# Patient Record
Sex: FEMALE | Race: WHITE | Employment: OTHER | URBAN - METROPOLITAN AREA
[De-identification: names, ages, dates, MRNs, and addresses within clinical notes are randomized per-mention and may not be internally consistent; named-entity substitution may affect disease eponyms.]

---

## 2023-02-16 PROBLEM — Z04.9 CONDITION NOT FOUND: Status: ACTIVE | Noted: 2023-02-16

## 2023-02-16 PROBLEM — E78.5 HYPERLIPIDEMIA: Status: ACTIVE | Noted: 2021-09-10

## 2023-02-16 PROBLEM — M93.90 OSTEOCHONDROPATHY: Status: ACTIVE | Noted: 2021-09-10

## 2023-02-16 PROBLEM — E66.01 MORBID OBESITY (MULTI): Status: ACTIVE | Noted: 2021-09-10

## 2023-02-16 PROBLEM — E78.2 MIXED HYPERLIPIDEMIA: Status: ACTIVE | Noted: 2021-09-10

## 2023-02-16 PROBLEM — I10 ESSENTIAL HYPERTENSION: Status: ACTIVE | Noted: 2022-03-29

## 2023-02-16 PROBLEM — M19.91 LOCALIZED, PRIMARY OSTEOARTHRITIS: Status: ACTIVE | Noted: 2022-03-29

## 2023-02-16 PROBLEM — E55.9 VITAMIN D DEFICIENCY: Status: ACTIVE | Noted: 2021-09-10

## 2023-02-16 PROBLEM — R03.0 ELEVATED BLOOD-PRESSURE READING WITHOUT DIAGNOSIS OF HYPERTENSION: Status: ACTIVE | Noted: 2021-09-10

## 2023-02-16 PROBLEM — Z72.0 TOBACCO USER: Status: ACTIVE | Noted: 2023-02-16

## 2023-02-16 RX ORDER — AMLODIPINE BESYLATE 5 MG/1
1 TABLET ORAL DAILY
COMMUNITY
Start: 2021-12-13 | End: 2023-03-20 | Stop reason: SDUPTHER

## 2023-02-16 RX ORDER — CHLORTHALIDONE 50 MG/1
1 TABLET ORAL DAILY
COMMUNITY
Start: 2021-11-24 | End: 2023-03-20 | Stop reason: SDUPTHER

## 2023-02-16 RX ORDER — POTASSIUM CHLORIDE 1500 MG/1
2 TABLET, EXTENDED RELEASE ORAL DAILY
COMMUNITY
Start: 2021-11-29 | End: 2023-03-20 | Stop reason: SDUPTHER

## 2023-03-14 LAB
ALANINE AMINOTRANSFERASE (SGPT) (U/L) IN SER/PLAS: 12 U/L (ref 7–45)
ALBUMIN (G/DL) IN SER/PLAS: 4.2 G/DL (ref 3.4–5)
ALKALINE PHOSPHATASE (U/L) IN SER/PLAS: 78 U/L (ref 33–136)
ANION GAP IN SER/PLAS: 9 MMOL/L (ref 10–20)
ASPARTATE AMINOTRANSFERASE (SGOT) (U/L) IN SER/PLAS: 17 U/L (ref 9–39)
BILIRUBIN TOTAL (MG/DL) IN SER/PLAS: 0.9 MG/DL (ref 0–1.2)
CALCIDIOL (25 OH VITAMIN D3) (NG/ML) IN SER/PLAS: 22 NG/ML
CALCIUM (MG/DL) IN SER/PLAS: 9.8 MG/DL (ref 8.6–10.3)
CARBON DIOXIDE, TOTAL (MMOL/L) IN SER/PLAS: 35 MMOL/L (ref 21–32)
CHLORIDE (MMOL/L) IN SER/PLAS: 100 MMOL/L (ref 98–107)
CHOLESTEROL (MG/DL) IN SER/PLAS: 229 MG/DL (ref 0–199)
CHOLESTEROL IN HDL (MG/DL) IN SER/PLAS: 63 MG/DL
CHOLESTEROL/HDL RATIO: 3.6
CREATININE (MG/DL) IN SER/PLAS: 0.55 MG/DL (ref 0.5–1.05)
GFR FEMALE: >90 ML/MIN/1.73M2
GLUCOSE (MG/DL) IN SER/PLAS: 82 MG/DL (ref 74–99)
LDL: 135 MG/DL (ref 0–99)
POTASSIUM (MMOL/L) IN SER/PLAS: 3.9 MMOL/L (ref 3.5–5.3)
PROTEIN TOTAL: 7.2 G/DL (ref 6.4–8.2)
SODIUM (MMOL/L) IN SER/PLAS: 140 MMOL/L (ref 136–145)
TRIGLYCERIDE (MG/DL) IN SER/PLAS: 155 MG/DL (ref 0–149)
UREA NITROGEN (MG/DL) IN SER/PLAS: 17 MG/DL (ref 6–23)
VLDL: 31 MG/DL (ref 0–40)

## 2023-03-20 ENCOUNTER — OFFICE VISIT (OUTPATIENT)
Dept: PRIMARY CARE | Facility: CLINIC | Age: 73
End: 2023-03-20
Payer: MEDICARE

## 2023-03-20 VITALS
DIASTOLIC BLOOD PRESSURE: 80 MMHG | TEMPERATURE: 97.5 F | HEIGHT: 66 IN | OXYGEN SATURATION: 98 % | WEIGHT: 223 LBS | SYSTOLIC BLOOD PRESSURE: 128 MMHG | HEART RATE: 67 BPM | BODY MASS INDEX: 35.84 KG/M2

## 2023-03-20 DIAGNOSIS — G25.0 BENIGN ESSENTIAL TREMOR: Chronic | ICD-10-CM

## 2023-03-20 DIAGNOSIS — R93.1 ELEVATED CORONARY ARTERY CALCIUM SCORE: ICD-10-CM

## 2023-03-20 DIAGNOSIS — I10 ESSENTIAL HYPERTENSION: Chronic | ICD-10-CM

## 2023-03-20 DIAGNOSIS — E55.9 VITAMIN D DEFICIENCY: ICD-10-CM

## 2023-03-20 DIAGNOSIS — E66.01 MORBID OBESITY (MULTI): Chronic | ICD-10-CM

## 2023-03-20 DIAGNOSIS — M85.89 OSTEOPENIA OF MULTIPLE SITES: Chronic | ICD-10-CM

## 2023-03-20 DIAGNOSIS — E78.2 MIXED HYPERLIPIDEMIA: ICD-10-CM

## 2023-03-20 DIAGNOSIS — Z13.6 SCREENING FOR HEART DISEASE: ICD-10-CM

## 2023-03-20 DIAGNOSIS — F17.210 NICOTINE DEPENDENCE, CIGARETTES, UNCOMPLICATED: Chronic | ICD-10-CM

## 2023-03-20 DIAGNOSIS — E87.6 DRUG-INDUCED HYPOKALEMIA: ICD-10-CM

## 2023-03-20 DIAGNOSIS — Z00.00 ROUTINE GENERAL MEDICAL EXAMINATION AT HEALTH CARE FACILITY: Primary | ICD-10-CM

## 2023-03-20 DIAGNOSIS — M17.0 BILATERAL PRIMARY OSTEOARTHRITIS OF KNEE: ICD-10-CM

## 2023-03-20 DIAGNOSIS — T50.905A DRUG-INDUCED HYPOKALEMIA: ICD-10-CM

## 2023-03-20 PROBLEM — E78.1 HYPERTRIGLYCERIDEMIA: Chronic | Status: ACTIVE | Noted: 2021-09-10

## 2023-03-20 PROBLEM — E78.1 HYPERTRIGLYCERIDEMIA: Status: ACTIVE | Noted: 2021-09-10

## 2023-03-20 PROBLEM — M85.80 OSTEOPENIA: Chronic | Status: ACTIVE | Noted: 2023-03-20

## 2023-03-20 PROBLEM — M85.80 OSTEOPENIA: Status: ACTIVE | Noted: 2023-03-20

## 2023-03-20 PROBLEM — M93.90 OSTEOCHONDROPATHY: Status: RESOLVED | Noted: 2021-09-10 | Resolved: 2023-03-20

## 2023-03-20 PROCEDURE — 3079F DIAST BP 80-89 MM HG: CPT | Performed by: FAMILY MEDICINE

## 2023-03-20 PROCEDURE — 1170F FXNL STATUS ASSESSED: CPT | Performed by: FAMILY MEDICINE

## 2023-03-20 PROCEDURE — 4004F PT TOBACCO SCREEN RCVD TLK: CPT | Performed by: FAMILY MEDICINE

## 2023-03-20 PROCEDURE — 3074F SYST BP LT 130 MM HG: CPT | Performed by: FAMILY MEDICINE

## 2023-03-20 PROCEDURE — 1160F RVW MEDS BY RX/DR IN RCRD: CPT | Performed by: FAMILY MEDICINE

## 2023-03-20 PROCEDURE — 20610 DRAIN/INJ JOINT/BURSA W/O US: CPT | Performed by: FAMILY MEDICINE

## 2023-03-20 PROCEDURE — 99406 BEHAV CHNG SMOKING 3-10 MIN: CPT | Performed by: FAMILY MEDICINE

## 2023-03-20 PROCEDURE — G0439 PPPS, SUBSEQ VISIT: HCPCS | Performed by: FAMILY MEDICINE

## 2023-03-20 PROCEDURE — 99214 OFFICE O/P EST MOD 30 MIN: CPT | Performed by: FAMILY MEDICINE

## 2023-03-20 PROCEDURE — 1159F MED LIST DOCD IN RCRD: CPT | Performed by: FAMILY MEDICINE

## 2023-03-20 RX ORDER — LIDOCAINE HYDROCHLORIDE 20 MG/ML
2 INJECTION, SOLUTION INFILTRATION; PERINEURAL ONCE
Status: COMPLETED | OUTPATIENT
Start: 2023-03-20 | End: 2023-03-20

## 2023-03-20 RX ORDER — TRIAMCINOLONE ACETONIDE 40 MG/ML
40 INJECTION, SUSPENSION INTRA-ARTICULAR; INTRAMUSCULAR ONCE
Status: COMPLETED | OUTPATIENT
Start: 2023-03-20 | End: 2023-03-20

## 2023-03-20 RX ORDER — CHLORTHALIDONE 50 MG/1
50 TABLET ORAL DAILY
Qty: 90 TABLET | Refills: 1 | Status: SHIPPED | OUTPATIENT
Start: 2023-03-20 | End: 2023-09-12 | Stop reason: SDUPTHER

## 2023-03-20 RX ORDER — AMLODIPINE BESYLATE 5 MG/1
5 TABLET ORAL DAILY
Qty: 90 TABLET | Refills: 1 | Status: SHIPPED | OUTPATIENT
Start: 2023-03-20 | End: 2023-09-12 | Stop reason: SDUPTHER

## 2023-03-20 RX ORDER — POTASSIUM CHLORIDE 1500 MG/1
40 TABLET, EXTENDED RELEASE ORAL DAILY
Qty: 180 TABLET | Refills: 1 | Status: SHIPPED | OUTPATIENT
Start: 2023-03-20 | End: 2023-09-12 | Stop reason: SDUPTHER

## 2023-03-20 RX ORDER — FUROSEMIDE 20 MG/1
1 TABLET ORAL DAILY PRN
COMMUNITY
Start: 2020-06-16 | End: 2023-03-20 | Stop reason: ALTCHOICE

## 2023-03-20 RX ORDER — BUPROPION HYDROCHLORIDE 100 MG/1
100 TABLET, EXTENDED RELEASE ORAL 2 TIMES DAILY
Qty: 60 TABLET | Refills: 0 | Status: SHIPPED | OUTPATIENT
Start: 2023-03-20 | End: 2023-09-12 | Stop reason: ALTCHOICE

## 2023-03-20 RX ADMIN — TRIAMCINOLONE ACETONIDE 40 MG: 40 INJECTION, SUSPENSION INTRA-ARTICULAR; INTRAMUSCULAR at 11:17

## 2023-03-20 RX ADMIN — LIDOCAINE HYDROCHLORIDE 40 MG: 20 INJECTION, SOLUTION INFILTRATION; PERINEURAL at 11:13

## 2023-03-20 RX ADMIN — LIDOCAINE HYDROCHLORIDE 40 MG: 20 INJECTION, SOLUTION INFILTRATION; PERINEURAL at 11:15

## 2023-03-20 ASSESSMENT — ACTIVITIES OF DAILY LIVING (ADL)
DOING_HOUSEWORK: INDEPENDENT
MANAGING_FINANCES: INDEPENDENT
TAKING_MEDICATION: INDEPENDENT
DRESSING: INDEPENDENT
GROCERY_SHOPPING: INDEPENDENT
BATHING: INDEPENDENT

## 2023-03-20 ASSESSMENT — ENCOUNTER SYMPTOMS
FEVER: 0
SHORTNESS OF BREATH: 0
ABDOMINAL PAIN: 0
DIARRHEA: 0
COUGH: 0
DYSURIA: 0
NAUSEA: 0
VOMITING: 0
CHILLS: 0

## 2023-03-20 ASSESSMENT — PATIENT HEALTH QUESTIONNAIRE - PHQ9
2. FEELING DOWN, DEPRESSED OR HOPELESS: NOT AT ALL
1. LITTLE INTEREST OR PLEASURE IN DOING THINGS: NOT AT ALL
SUM OF ALL RESPONSES TO PHQ9 QUESTIONS 1 AND 2: 0

## 2023-03-20 NOTE — ASSESSMENT & PLAN NOTE
Due to ASCVD risk of 23.6%, recommend statin.  Patient prefers not to start medication at this time.  Recommend that we check a calcium score to further evaluate her risk.

## 2023-03-20 NOTE — ASSESSMENT & PLAN NOTE
Spent more than 3 minutes but less than 10 minutes counseling on smoking cessation.  Start bupropion twice daily to help with smoking cessation.

## 2023-03-20 NOTE — PROGRESS NOTES
"Subjective   Reason for Visit: Kathya Hassan is an 72 y.o. female here for a Medicare Wellness visit.     Past Medical, Surgical, and Family History reviewed and updated in chart.    Reviewed all medications by prescribing practitioner or clinical pharmacist (such as prescriptions, OTCs, herbal therapies and supplements) and documented in the medical record.    Overall Kathya has been feeling well.  Her main issue is knee pain.  Did see Dr. Salcedo last year and was told that needed a right knee replacement.  Due to logistics of having surgery, patient wishes to prefer knee surgery until the fall 2023.  She is requesting a knee injection today so that she can continue walking pain-free throughout the summer.    Kathya is taking her blood pressure medicine as prescribed has not missed any doses and is not having any adverse effects.    She is ready to quit smoking. Has not tried medication in the past but is willing to try something that can help with her cravings.        Patient Care Team:  Lisa Lewis DO as PCP - General     Review of Systems   Constitutional:  Negative for chills and fever.   Respiratory:  Negative for cough and shortness of breath.    Cardiovascular:  Negative for chest pain.   Gastrointestinal:  Negative for abdominal pain, diarrhea, nausea and vomiting.   Genitourinary:  Negative for dysuria.       Objective   Vitals:  /80   Pulse 67   Temp 36.4 °C (97.5 °F)   Ht 1.676 m (5' 6\")   Wt 101 kg (223 lb)   SpO2 98%   BMI 35.99 kg/m²       Physical Exam  Constitutional:       General: She is not in acute distress.     Appearance: Normal appearance.   HENT:      Head: Normocephalic.      Mouth/Throat:      Mouth: Mucous membranes are moist.   Eyes:      Extraocular Movements: Extraocular movements intact.      Conjunctiva/sclera: Conjunctivae normal.   Cardiovascular:      Rate and Rhythm: Normal rate and regular rhythm.      Heart sounds: No murmur heard.  Pulmonary:      Breath " sounds: No wheezing or rhonchi.   Musculoskeletal:      Cervical back: Neck supple.   Skin:     General: Skin is warm and dry.   Neurological:      Mental Status: She is alert.   Psychiatric:         Mood and Affect: Mood normal.         Behavior: Behavior normal.       Knee Injection Procedure Note    Region: bilateral knees    Indications: DJD of knee    Consent: Risks, benefits, and alternatives discussed with patient. Patient informed and understands. Verbal consent obtained.    Preparation: Patient positioned appropriately. Skin prepped with alcohol. Clean technique maintained throughout.     Procedure:  Right knee entered inferolaterally and injected with 1 cc of Kenalog 40 mg/cc and  2cc Lidocaine plain 2%. Left knee entered inferomedially and injected with 1 cc of Kenalog 40 mg/cc and  2cc Lidocaine plain 2%.      Complications: None; patient tolerated the procedure well.    Post-procedure counseling: OK to resume most normal activities, OK to resume NSAIDs. Notify office if redness, swelling, increasing pain or fever.      Assessment/Plan   Problem List Items Addressed This Visit          Nervous    RESOLVED: Benign essential tremor (Chronic)    Relevant Orders    Lipid Panel    Follow Up In Primary Care       Circulatory    Essential hypertension (Chronic)    Current Assessment & Plan     Stable.  Continue amlodipine and chlorthalidone.         Relevant Medications    amLODIPine (Norvasc) 5 mg tablet    chlorthalidone (Hygroton) 50 mg tablet    Other Relevant Orders    CT cardiac scoring wo IV contrast    Follow Up In Primary Care       Musculoskeletal    Osteopenia (Chronic)    Relevant Orders    Follow Up In Primary Care    Bilateral primary osteoarthritis of knee (Chronic)    Overview     Did see Dr. Salcedo in 2022.  Right knee replacement recommended         Current Assessment & Plan     Bilateral knee injections given today.         Relevant Medications    lidocaine (Xylocaine) 20 mg/mL (2 %) injection  40 mg (Completed)    triamcinolone acetonide (Kenalog-40) injection 40 mg (Completed)    lidocaine (Xylocaine) 20 mg/mL (2 %) injection 40 mg (Completed)    triamcinolone acetonide (Kenalog-40) injection 40 mg (Completed)       Endocrine/Metabolic    Morbid obesity (CMS/HCC) (Chronic)    Vitamin D deficiency       Other    Mixed hyperlipidemia (Chronic)    Current Assessment & Plan     Due to ASCVD risk of 23.6%, recommend statin.  Patient prefers not to start medication at this time.  Recommend that we check a calcium score to further evaluate her risk.         Relevant Orders    Lipid Panel    Nicotine dependence, cigarettes, uncomplicated (Chronic)    Current Assessment & Plan     Spent more than 3 minutes but less than 10 minutes counseling on smoking cessation.  Start bupropion twice daily to help with smoking cessation.         Relevant Medications    buPROPion SR (Wellbutrin SR) 100 mg 12 hr tablet    Drug-induced hypokalemia (Chronic)    Current Assessment & Plan     Continue potassium supplement while on chlorthalidone.         Relevant Medications    potassium chloride CR (K-Tab) 20 mEq ER tablet    Other Relevant Orders    Comprehensive Metabolic Panel     Other Visit Diagnoses       Routine general medical examination at health care facility    -  Primary    Screening for heart disease        Relevant Orders    CT cardiac scoring wo IV contrast

## 2023-04-03 ENCOUNTER — TELEPHONE (OUTPATIENT)
Dept: PRIMARY CARE | Facility: CLINIC | Age: 73
End: 2023-04-03
Payer: MEDICARE

## 2023-04-03 NOTE — TELEPHONE ENCOUNTER
----- Message from Lisa Lewis DO sent at 4/2/2023 12:53 PM EDT -----  Please let patient know that her calcium score was severely elevated.  A normal calcium score is 0 and hers was 2578.  Means that she has a very high risk of heart disease.  There are also changes noted on her mitral valve.  Due to these findings, I recommend that she see a cardiologist and consider starting cholesterol medication.  I have placed an order for cardiology consult.   Detail Level: Detailed

## 2023-04-03 NOTE — TELEPHONE ENCOUNTER
----- Message from Lisa Lewis DO sent at 4/2/2023 12:53 PM EDT -----  Please let patient know that her calcium score was severely elevated.  A normal calcium score is 0 and hers was 2578.  Means that she has a very high risk of heart disease.  There are also changes noted on her mitral valve.  Due to these findings, I recommend that she see a cardiologist and consider starting cholesterol medication.  I have placed an order for cardiology consult.

## 2023-06-12 LAB
CHOLESTEROL (MG/DL) IN SER/PLAS: 132 MG/DL (ref 0–199)
CHOLESTEROL IN HDL (MG/DL) IN SER/PLAS: 78.6 MG/DL
CHOLESTEROL/HDL RATIO: 1.7
LDL: 37 MG/DL (ref 0–99)
TRIGLYCERIDE (MG/DL) IN SER/PLAS: 82 MG/DL (ref 0–149)
VLDL: 16 MG/DL (ref 0–40)

## 2023-09-01 ENCOUNTER — LAB (OUTPATIENT)
Dept: LAB | Facility: LAB | Age: 73
End: 2023-09-01
Payer: MEDICARE

## 2023-09-01 DIAGNOSIS — T50.905A DRUG-INDUCED HYPOKALEMIA: ICD-10-CM

## 2023-09-01 DIAGNOSIS — E78.2 MIXED HYPERLIPIDEMIA: ICD-10-CM

## 2023-09-01 DIAGNOSIS — E87.6 DRUG-INDUCED HYPOKALEMIA: ICD-10-CM

## 2023-09-01 DIAGNOSIS — G25.0 BENIGN ESSENTIAL TREMOR: Chronic | ICD-10-CM

## 2023-09-01 LAB
ALANINE AMINOTRANSFERASE (SGPT) (U/L) IN SER/PLAS: 18 U/L (ref 7–45)
ALBUMIN (G/DL) IN SER/PLAS: 4.4 G/DL (ref 3.4–5)
ALKALINE PHOSPHATASE (U/L) IN SER/PLAS: 77 U/L (ref 33–136)
ANION GAP IN SER/PLAS: 12 MMOL/L (ref 10–20)
ASPARTATE AMINOTRANSFERASE (SGOT) (U/L) IN SER/PLAS: 22 U/L (ref 9–39)
BILIRUBIN TOTAL (MG/DL) IN SER/PLAS: 1.2 MG/DL (ref 0–1.2)
CALCIUM (MG/DL) IN SER/PLAS: 10.1 MG/DL (ref 8.6–10.3)
CARBON DIOXIDE, TOTAL (MMOL/L) IN SER/PLAS: 33 MMOL/L (ref 21–32)
CHLORIDE (MMOL/L) IN SER/PLAS: 98 MMOL/L (ref 98–107)
CHOLESTEROL (MG/DL) IN SER/PLAS: 150 MG/DL (ref 0–199)
CHOLESTEROL IN HDL (MG/DL) IN SER/PLAS: 73.2 MG/DL
CHOLESTEROL/HDL RATIO: 2
CREATININE (MG/DL) IN SER/PLAS: 0.6 MG/DL (ref 0.5–1.05)
GFR FEMALE: >90 ML/MIN/1.73M2
GLUCOSE (MG/DL) IN SER/PLAS: 87 MG/DL (ref 74–99)
LDL: 49 MG/DL (ref 0–99)
POTASSIUM (MMOL/L) IN SER/PLAS: 3.8 MMOL/L (ref 3.5–5.3)
PROTEIN TOTAL: 6.9 G/DL (ref 6.4–8.2)
SODIUM (MMOL/L) IN SER/PLAS: 139 MMOL/L (ref 136–145)
TRIGLYCERIDE (MG/DL) IN SER/PLAS: 140 MG/DL (ref 0–149)
UREA NITROGEN (MG/DL) IN SER/PLAS: 15 MG/DL (ref 6–23)
VLDL: 28 MG/DL (ref 0–40)

## 2023-09-01 PROCEDURE — 80053 COMPREHEN METABOLIC PANEL: CPT

## 2023-09-01 PROCEDURE — 80061 LIPID PANEL: CPT

## 2023-09-01 PROCEDURE — 36415 COLL VENOUS BLD VENIPUNCTURE: CPT

## 2023-09-12 ENCOUNTER — OFFICE VISIT (OUTPATIENT)
Dept: PRIMARY CARE | Facility: CLINIC | Age: 73
End: 2023-09-12
Payer: MEDICARE

## 2023-09-12 VITALS
HEART RATE: 68 BPM | SYSTOLIC BLOOD PRESSURE: 126 MMHG | TEMPERATURE: 97 F | OXYGEN SATURATION: 96 % | BODY MASS INDEX: 36.8 KG/M2 | DIASTOLIC BLOOD PRESSURE: 74 MMHG | WEIGHT: 228 LBS

## 2023-09-12 DIAGNOSIS — Z12.31 ENCOUNTER FOR SCREENING MAMMOGRAM FOR MALIGNANT NEOPLASM OF BREAST: ICD-10-CM

## 2023-09-12 DIAGNOSIS — E55.9 VITAMIN D DEFICIENCY: ICD-10-CM

## 2023-09-12 DIAGNOSIS — E66.01 MORBID OBESITY (MULTI): Chronic | ICD-10-CM

## 2023-09-12 DIAGNOSIS — F17.210 NICOTINE DEPENDENCE, CIGARETTES, UNCOMPLICATED: Chronic | ICD-10-CM

## 2023-09-12 DIAGNOSIS — E87.6 DRUG-INDUCED HYPOKALEMIA: ICD-10-CM

## 2023-09-12 DIAGNOSIS — E78.2 MIXED HYPERLIPIDEMIA: Primary | Chronic | ICD-10-CM

## 2023-09-12 DIAGNOSIS — T50.905A DRUG-INDUCED HYPOKALEMIA: ICD-10-CM

## 2023-09-12 DIAGNOSIS — G25.0 BENIGN ESSENTIAL TREMOR: Chronic | ICD-10-CM

## 2023-09-12 DIAGNOSIS — Z23 NEEDS FLU SHOT: ICD-10-CM

## 2023-09-12 DIAGNOSIS — M85.89 OSTEOPENIA OF MULTIPLE SITES: Chronic | ICD-10-CM

## 2023-09-12 DIAGNOSIS — M17.0 BILATERAL PRIMARY OSTEOARTHRITIS OF KNEE: Chronic | ICD-10-CM

## 2023-09-12 DIAGNOSIS — I10 ESSENTIAL HYPERTENSION: Chronic | ICD-10-CM

## 2023-09-12 PROBLEM — R93.1 ELEVATED CORONARY ARTERY CALCIUM SCORE: Chronic | Status: ACTIVE | Noted: 2023-09-12

## 2023-09-12 PROCEDURE — 1159F MED LIST DOCD IN RCRD: CPT | Performed by: FAMILY MEDICINE

## 2023-09-12 PROCEDURE — 3074F SYST BP LT 130 MM HG: CPT | Performed by: FAMILY MEDICINE

## 2023-09-12 PROCEDURE — 3078F DIAST BP <80 MM HG: CPT | Performed by: FAMILY MEDICINE

## 2023-09-12 PROCEDURE — G0008 ADMIN INFLUENZA VIRUS VAC: HCPCS | Performed by: FAMILY MEDICINE

## 2023-09-12 PROCEDURE — 99214 OFFICE O/P EST MOD 30 MIN: CPT | Performed by: FAMILY MEDICINE

## 2023-09-12 PROCEDURE — 1160F RVW MEDS BY RX/DR IN RCRD: CPT | Performed by: FAMILY MEDICINE

## 2023-09-12 PROCEDURE — 90662 IIV NO PRSV INCREASED AG IM: CPT | Performed by: FAMILY MEDICINE

## 2023-09-12 PROCEDURE — 4004F PT TOBACCO SCREEN RCVD TLK: CPT | Performed by: FAMILY MEDICINE

## 2023-09-12 RX ORDER — ROSUVASTATIN CALCIUM 40 MG/1
40 TABLET, COATED ORAL DAILY
COMMUNITY
Start: 2023-08-05 | End: 2023-11-10

## 2023-09-12 RX ORDER — CHLORTHALIDONE 50 MG/1
50 TABLET ORAL DAILY
Qty: 90 TABLET | Refills: 1 | Status: SHIPPED | OUTPATIENT
Start: 2023-09-12 | End: 2024-03-12 | Stop reason: SDUPTHER

## 2023-09-12 RX ORDER — POTASSIUM CHLORIDE 20 MEQ/1
40 TABLET, EXTENDED RELEASE ORAL DAILY
Qty: 180 TABLET | Refills: 1 | Status: SHIPPED | OUTPATIENT
Start: 2023-09-12 | End: 2024-03-12 | Stop reason: SDUPTHER

## 2023-09-12 RX ORDER — AMLODIPINE BESYLATE 5 MG/1
5 TABLET ORAL DAILY
Qty: 90 TABLET | Refills: 1 | Status: SHIPPED | OUTPATIENT
Start: 2023-09-12 | End: 2024-03-12 | Stop reason: SDUPTHER

## 2023-09-12 ASSESSMENT — ENCOUNTER SYMPTOMS
VOMITING: 0
NAUSEA: 0
ABDOMINAL PAIN: 0
DIARRHEA: 0
CHILLS: 0
COUGH: 0
FEVER: 0
SHORTNESS OF BREATH: 0

## 2023-09-12 NOTE — PROGRESS NOTES
Subjective   Patient ID: Kathya Hassan is a 73 y.o. female who presents for Hypertension, Hyperlipidemia, and Vitamin D Deficiency (Recheck. Review labs.).    Kathya presents for follow-up. She has been feeling well. No new problems.     She did see Dr. Mancilla regarding abnormal calcium score and annular calcification. Rosuvastatin 40mg was started. She is tolerating well.          Review of Systems   Constitutional:  Negative for chills and fever.   Respiratory:  Negative for cough and shortness of breath.    Cardiovascular:  Negative for chest pain.   Gastrointestinal:  Negative for abdominal pain, diarrhea, nausea and vomiting.       Objective   /74   Pulse 68   Temp 36.1 °C (97 °F)   Wt 103 kg (228 lb)   SpO2 96%   BMI 36.80 kg/m²     Physical Exam  Constitutional:       General: She is not in acute distress.     Appearance: Normal appearance.   HENT:      Head: Normocephalic.      Mouth/Throat:      Mouth: Mucous membranes are moist.   Eyes:      Extraocular Movements: Extraocular movements intact.      Conjunctiva/sclera: Conjunctivae normal.   Cardiovascular:      Rate and Rhythm: Normal rate and regular rhythm.      Heart sounds: No murmur heard.  Pulmonary:      Breath sounds: No wheezing or rhonchi.   Musculoskeletal:      Cervical back: Neck supple.   Skin:     General: Skin is warm and dry.   Neurological:      Mental Status: She is alert.   Psychiatric:         Mood and Affect: Mood normal.         Behavior: Behavior normal.         Assessment/Plan   Problem List Items Addressed This Visit       Mixed hyperlipidemia - Primary (Chronic)     LDL improved; continue rosuvastatin 40mg.          Vitamin D deficiency    Nicotine dependence, cigarettes, uncomplicated (Chronic)    Morbid obesity (CMS/HCC) (Chronic)    Essential hypertension (Chronic)     Controlled. Continue chlorthalidone and amlodipine.          Relevant Medications    chlorthalidone (Hygroton) 50 mg tablet    amLODIPine (Norvasc) 5  mg tablet    Other Relevant Orders    Comprehensive Metabolic Panel    Osteopenia (Chronic)    Bilateral primary osteoarthritis of knee (Chronic)    Drug-induced hypokalemia (Chronic)    Relevant Medications    potassium chloride CR 20 mEq ER tablet     Other Visit Diagnoses       Benign essential tremor  (Chronic)       Encounter for screening mammogram for malignant neoplasm of breast        Relevant Orders    BI mammo bilateral screening tomosynthesis    Needs flu shot        Relevant Orders    Flu vaccine, quadrivalent, high-dose, preservative free, age 65y+ (FLUZONE) (Completed)

## 2023-10-18 ENCOUNTER — HOSPITAL ENCOUNTER (OUTPATIENT)
Dept: RADIOLOGY | Facility: HOSPITAL | Age: 73
Discharge: HOME | End: 2023-10-18
Payer: MEDICARE

## 2023-10-18 DIAGNOSIS — Z12.31 ENCOUNTER FOR SCREENING MAMMOGRAM FOR MALIGNANT NEOPLASM OF BREAST: ICD-10-CM

## 2023-10-18 PROCEDURE — 77067 SCR MAMMO BI INCL CAD: CPT | Mod: BILATERAL PROCEDURE | Performed by: RADIOLOGY

## 2023-10-18 PROCEDURE — 77063 BREAST TOMOSYNTHESIS BI: CPT

## 2023-10-18 PROCEDURE — 77063 BREAST TOMOSYNTHESIS BI: CPT | Mod: BILATERAL PROCEDURE | Performed by: RADIOLOGY

## 2023-11-10 DIAGNOSIS — E78.2 MIXED HYPERLIPIDEMIA: Primary | Chronic | ICD-10-CM

## 2023-11-10 RX ORDER — ROSUVASTATIN CALCIUM 40 MG/1
40 TABLET, COATED ORAL DAILY
Qty: 90 TABLET | Refills: 3 | Status: SHIPPED | OUTPATIENT
Start: 2023-11-10 | End: 2024-03-12 | Stop reason: SDUPTHER

## 2024-03-04 ENCOUNTER — LAB (OUTPATIENT)
Dept: LAB | Facility: LAB | Age: 74
End: 2024-03-04
Payer: MEDICARE

## 2024-03-04 DIAGNOSIS — I10 ESSENTIAL HYPERTENSION: Chronic | ICD-10-CM

## 2024-03-04 LAB
ALBUMIN SERPL BCP-MCNC: 4.1 G/DL (ref 3.4–5)
ALP SERPL-CCNC: 76 U/L (ref 33–136)
ALT SERPL W P-5'-P-CCNC: 14 U/L (ref 7–45)
ANION GAP SERPL CALC-SCNC: 13 MMOL/L (ref 10–20)
AST SERPL W P-5'-P-CCNC: 20 U/L (ref 9–39)
BILIRUB SERPL-MCNC: 1 MG/DL (ref 0–1.2)
BUN SERPL-MCNC: 18 MG/DL (ref 6–23)
CALCIUM SERPL-MCNC: 9.7 MG/DL (ref 8.6–10.3)
CHLORIDE SERPL-SCNC: 100 MMOL/L (ref 98–107)
CO2 SERPL-SCNC: 31 MMOL/L (ref 21–32)
CREAT SERPL-MCNC: 0.55 MG/DL (ref 0.5–1.05)
EGFRCR SERPLBLD CKD-EPI 2021: >90 ML/MIN/1.73M*2
GLUCOSE SERPL-MCNC: 89 MG/DL (ref 74–99)
POTASSIUM SERPL-SCNC: 3.5 MMOL/L (ref 3.5–5.3)
PROT SERPL-MCNC: 6.7 G/DL (ref 6.4–8.2)
SODIUM SERPL-SCNC: 140 MMOL/L (ref 136–145)

## 2024-03-04 PROCEDURE — 80053 COMPREHEN METABOLIC PANEL: CPT

## 2024-03-04 PROCEDURE — 36415 COLL VENOUS BLD VENIPUNCTURE: CPT

## 2024-03-12 ENCOUNTER — OFFICE VISIT (OUTPATIENT)
Dept: PRIMARY CARE | Facility: CLINIC | Age: 74
End: 2024-03-12
Payer: MEDICARE

## 2024-03-12 VITALS
WEIGHT: 240 LBS | OXYGEN SATURATION: 96 % | DIASTOLIC BLOOD PRESSURE: 82 MMHG | SYSTOLIC BLOOD PRESSURE: 124 MMHG | HEART RATE: 76 BPM | BODY MASS INDEX: 38.57 KG/M2 | TEMPERATURE: 97.4 F | HEIGHT: 66 IN

## 2024-03-12 DIAGNOSIS — Z78.9 FULL CODE STATUS: ICD-10-CM

## 2024-03-12 DIAGNOSIS — Z12.11 SCREEN FOR COLON CANCER: ICD-10-CM

## 2024-03-12 DIAGNOSIS — T50.905A DRUG-INDUCED HYPOKALEMIA: ICD-10-CM

## 2024-03-12 DIAGNOSIS — Z00.00 ROUTINE GENERAL MEDICAL EXAMINATION AT HEALTH CARE FACILITY: Primary | ICD-10-CM

## 2024-03-12 DIAGNOSIS — Z12.31 ENCOUNTER FOR SCREENING MAMMOGRAM FOR MALIGNANT NEOPLASM OF BREAST: ICD-10-CM

## 2024-03-12 DIAGNOSIS — M17.0 BILATERAL PRIMARY OSTEOARTHRITIS OF KNEE: Chronic | ICD-10-CM

## 2024-03-12 DIAGNOSIS — F17.210 NICOTINE DEPENDENCE, CIGARETTES, UNCOMPLICATED: Chronic | ICD-10-CM

## 2024-03-12 DIAGNOSIS — E87.6 DRUG-INDUCED HYPOKALEMIA: ICD-10-CM

## 2024-03-12 DIAGNOSIS — E78.2 MIXED HYPERLIPIDEMIA: Chronic | ICD-10-CM

## 2024-03-12 DIAGNOSIS — I10 ESSENTIAL HYPERTENSION: Chronic | ICD-10-CM

## 2024-03-12 DIAGNOSIS — M85.89 OSTEOPENIA OF MULTIPLE SITES: Chronic | ICD-10-CM

## 2024-03-12 DIAGNOSIS — E66.01 MORBID OBESITY (MULTI): ICD-10-CM

## 2024-03-12 DIAGNOSIS — G25.2 RESTING TREMOR: ICD-10-CM

## 2024-03-12 PROCEDURE — 1123F ACP DISCUSS/DSCN MKR DOCD: CPT | Performed by: FAMILY MEDICINE

## 2024-03-12 PROCEDURE — 1159F MED LIST DOCD IN RCRD: CPT | Performed by: FAMILY MEDICINE

## 2024-03-12 PROCEDURE — 1170F FXNL STATUS ASSESSED: CPT | Performed by: FAMILY MEDICINE

## 2024-03-12 PROCEDURE — 1158F ADVNC CARE PLAN TLK DOCD: CPT | Performed by: FAMILY MEDICINE

## 2024-03-12 PROCEDURE — 20610 DRAIN/INJ JOINT/BURSA W/O US: CPT | Performed by: FAMILY MEDICINE

## 2024-03-12 PROCEDURE — 3074F SYST BP LT 130 MM HG: CPT | Performed by: FAMILY MEDICINE

## 2024-03-12 PROCEDURE — 99214 OFFICE O/P EST MOD 30 MIN: CPT | Performed by: FAMILY MEDICINE

## 2024-03-12 PROCEDURE — 1160F RVW MEDS BY RX/DR IN RCRD: CPT | Performed by: FAMILY MEDICINE

## 2024-03-12 PROCEDURE — G0439 PPPS, SUBSEQ VISIT: HCPCS | Performed by: FAMILY MEDICINE

## 2024-03-12 PROCEDURE — 3079F DIAST BP 80-89 MM HG: CPT | Performed by: FAMILY MEDICINE

## 2024-03-12 RX ORDER — LIDOCAINE HYDROCHLORIDE 20 MG/ML
2 INJECTION, SOLUTION INFILTRATION; PERINEURAL ONCE
Status: COMPLETED | OUTPATIENT
Start: 2024-03-12 | End: 2024-03-12

## 2024-03-12 RX ORDER — CHLORTHALIDONE 50 MG/1
50 TABLET ORAL DAILY
Qty: 90 TABLET | Refills: 1 | Status: SHIPPED | OUTPATIENT
Start: 2024-03-12

## 2024-03-12 RX ORDER — POTASSIUM CHLORIDE 20 MEQ/1
40 TABLET, EXTENDED RELEASE ORAL DAILY
Qty: 180 TABLET | Refills: 1 | Status: SHIPPED | OUTPATIENT
Start: 2024-03-12

## 2024-03-12 RX ORDER — AMLODIPINE BESYLATE 5 MG/1
5 TABLET ORAL DAILY
Qty: 90 TABLET | Refills: 1 | Status: SHIPPED | OUTPATIENT
Start: 2024-03-12

## 2024-03-12 RX ORDER — ROSUVASTATIN CALCIUM 40 MG/1
40 TABLET, COATED ORAL DAILY
Qty: 90 TABLET | Refills: 3 | Status: SHIPPED | OUTPATIENT
Start: 2024-03-12

## 2024-03-12 RX ORDER — TRIAMCINOLONE ACETONIDE 40 MG/ML
40 INJECTION, SUSPENSION INTRA-ARTICULAR; INTRAMUSCULAR ONCE
Status: COMPLETED | OUTPATIENT
Start: 2024-03-12 | End: 2024-03-12

## 2024-03-12 RX ADMIN — LIDOCAINE HYDROCHLORIDE 2 ML: 20 INJECTION, SOLUTION INFILTRATION; PERINEURAL at 10:46

## 2024-03-12 RX ADMIN — TRIAMCINOLONE ACETONIDE 40 MG: 40 INJECTION, SUSPENSION INTRA-ARTICULAR; INTRAMUSCULAR at 10:47

## 2024-03-12 RX ADMIN — TRIAMCINOLONE ACETONIDE 40 MG: 40 INJECTION, SUSPENSION INTRA-ARTICULAR; INTRAMUSCULAR at 10:48

## 2024-03-12 RX ADMIN — LIDOCAINE HYDROCHLORIDE 2 ML: 20 INJECTION, SOLUTION INFILTRATION; PERINEURAL at 10:47

## 2024-03-12 ASSESSMENT — ENCOUNTER SYMPTOMS
ABDOMINAL PAIN: 0
DIZZINESS: 0
CHILLS: 0
FEVER: 0
NUMBNESS: 0
DIARRHEA: 0
CONSTIPATION: 0
COUGH: 0
SHORTNESS OF BREATH: 0
TREMORS: 1
WEAKNESS: 0
PALPITATIONS: 0
RHINORRHEA: 0
NAUSEA: 0
FATIGUE: 0
DYSURIA: 0
APPETITE CHANGE: 0
VOMITING: 0

## 2024-03-12 ASSESSMENT — ACTIVITIES OF DAILY LIVING (ADL)
DRESSING: INDEPENDENT
GROCERY_SHOPPING: INDEPENDENT
TAKING_MEDICATION: INDEPENDENT
DOING_HOUSEWORK: INDEPENDENT
BATHING: INDEPENDENT
MANAGING_FINANCES: INDEPENDENT

## 2024-03-12 ASSESSMENT — PATIENT HEALTH QUESTIONNAIRE - PHQ9
1. LITTLE INTEREST OR PLEASURE IN DOING THINGS: NOT AT ALL
SUM OF ALL RESPONSES TO PHQ9 QUESTIONS 1 AND 2: 0
2. FEELING DOWN, DEPRESSED OR HOPELESS: NOT AT ALL

## 2024-03-12 NOTE — PROGRESS NOTES
"Subjective   Reason for Visit: Kathya Hassan is an 73 y.o. female here for a Medicare Wellness visit.     Past Medical, Surgical, and Family History reviewed and updated in chart.    Reviewed all medications by prescribing practitioner or clinical pharmacist (such as prescriptions, OTCs, herbal therapies and supplements) and documented in the medical record.    Kathya presents for follow-up and annual wellness exam.    Her knees have been acting up again. Hurting when walking. Last knee injection 3/2023.     Has had a tremor for last year. Happens when resting. Worse in left hand. Has not noticed tremor when performing daily activities.     Bps have been stable.         Patient Care Team:  Lisa Lewis DO as PCP - General  Lisa Lewis DO as PCP - O Medicare Advantage PCP     Review of Systems   Constitutional:  Negative for appetite change, chills, fatigue and fever.   HENT:  Negative for congestion and rhinorrhea.    Respiratory:  Negative for cough and shortness of breath.    Cardiovascular:  Negative for chest pain and palpitations.   Gastrointestinal:  Negative for abdominal pain, constipation, diarrhea, nausea and vomiting.   Genitourinary:  Negative for dysuria.   Neurological:  Positive for tremors. Negative for dizziness, weakness and numbness.       Objective   Vitals:  /82   Pulse 76   Temp 36.3 °C (97.4 °F)   Ht 1.676 m (5' 6\")   Wt 109 kg (240 lb)   SpO2 96%   BMI 38.74 kg/m²       Physical Exam  Constitutional:       General: She is not in acute distress.     Appearance: Normal appearance.   HENT:      Head: Normocephalic.      Mouth/Throat:      Mouth: Mucous membranes are moist.   Eyes:      Extraocular Movements: Extraocular movements intact.      Conjunctiva/sclera: Conjunctivae normal.   Cardiovascular:      Rate and Rhythm: Normal rate and regular rhythm.      Heart sounds: No murmur heard.  Pulmonary:      Breath sounds: No wheezing or rhonchi.   Musculoskeletal: "      Cervical back: Neck supple.      Right knee: No swelling or effusion. No LCL laxity, MCL laxity, ACL laxity or PCL laxity. Normal meniscus.      Left knee: No swelling or effusion. No LCL laxity, MCL laxity, ACL laxity or PCL laxity.Normal meniscus.   Skin:     General: Skin is warm and dry.   Neurological:      Mental Status: She is alert.   Psychiatric:         Mood and Affect: Mood normal.         Behavior: Behavior normal.         Knee Injection Procedure Note    Region: bilateral knees    Indications: DJD of knee    Consent: Risks, benefits, and alternatives discussed with patient. Patient informed and understands. Verbal consent obtained.    Preparation: Patient positioned appropriately. Skin prepped with alcohol. Clean technique maintained throughout.     Procedure:  Right knee entered inferolaterally and injected with 1 cc of Kenalog 40 mg/cc and  2cc Lidocaine plain 2%. Left knee entered inferomedially and injected with 1 cc of Kenalog 40 mg/cc and  2cc Lidocaine plain 2%.      Complications: None; patient tolerated the procedure well.    Post-procedure counseling: OK to resume most normal activities, OK to resume NSAIDs. Notify office if redness, swelling, increasing pain or fever.     Assessment/Plan   Problem List Items Addressed This Visit       Mixed hyperlipidemia (Chronic)    Current Assessment & Plan     LDL at goal.          Relevant Medications    rosuvastatin (Crestor) 40 mg tablet    Other Relevant Orders    Lipid Panel    Nicotine dependence, cigarettes, uncomplicated (Chronic)    Current Assessment & Plan     Recommend cessation.         Morbid obesity (CMS/HCC) (Chronic)    Current Assessment & Plan     Continue lifestyle changes.          Essential hypertension (Chronic)    Current Assessment & Plan     Controlled.          Relevant Medications    chlorthalidone (Hygroton) 50 mg tablet    amLODIPine (Norvasc) 5 mg tablet    Other Relevant Orders    Comprehensive Metabolic Panel     Osteopenia (Chronic)    Relevant Orders    XR DEXA bone density    Bilateral primary osteoarthritis of knee (Chronic)    Overview     - Consult with Dr. Salcedo in 2022.  Right knee replacement recommended.    - Knee steroid injections, bilateral: 3/12/2024, 3/20/2023.         Current Assessment & Plan     Bilateral knee injections performed today (last done 3/2023).         Relevant Medications    triamcinolone acetonide (Kenalog-40) injection 40 mg (Completed)    lidocaine (Xylocaine) 20 mg/mL (2 %) injection 2 mL (Completed)    triamcinolone acetonide (Kenalog-40) injection 40 mg (Completed)    lidocaine (Xylocaine) 20 mg/mL (2 %) injection 2 mL (Completed)    Drug-induced hypokalemia (Chronic)    Relevant Medications    potassium chloride CR 20 mEq ER tablet    Resting tremor    Overview     Suspected Parkinson's. Patient declined neurology referral 03/2024.         Current Assessment & Plan     Suspected Parkinson's. Patient declined further work-up and neurology referral 03/2024.          Other Visit Diagnoses       Routine general medical examination at health care facility    -  Primary    Mammogram, DEXA and colonoscopt ordered.    Full code status        Relevant Orders    Full code (Completed)    Encounter for screening mammogram for malignant neoplasm of breast        Relevant Orders    BI mammo bilateral screening tomosynthesis    Screen for colon cancer        Relevant Orders    Colonoscopy Screening; Average Risk Patient

## 2024-03-12 NOTE — ASSESSMENT & PLAN NOTE
Suspected Parkinson's. Patient declined further work-up and neurology referral 03/2024.   Opioid Pregnancy And Lactation Text: These medications can lead to premature delivery and should be avoided during pregnancy. These medications are also present in breast milk in small amounts.

## 2024-04-30 ENCOUNTER — HOSPITAL ENCOUNTER (OUTPATIENT)
Dept: RADIOLOGY | Facility: CLINIC | Age: 74
Discharge: HOME | End: 2024-04-30
Payer: MEDICARE

## 2024-04-30 DIAGNOSIS — M85.89 OSTEOPENIA OF MULTIPLE SITES: Chronic | ICD-10-CM

## 2024-04-30 PROCEDURE — 77080 DXA BONE DENSITY AXIAL: CPT

## 2024-04-30 PROCEDURE — 77080 DXA BONE DENSITY AXIAL: CPT | Performed by: RADIOLOGY

## 2024-07-06 PROBLEM — I25.10 CORONARY ARTERY CALCIFICATION: Status: ACTIVE | Noted: 2023-09-12

## 2024-07-06 PROBLEM — I34.81 NONRHEUMATIC MITRAL (VALVE) ANNULUS CALCIFICATION: Status: ACTIVE | Noted: 2024-07-06

## 2024-07-06 PROBLEM — I25.84 CORONARY ARTERY CALCIFICATION: Status: ACTIVE | Noted: 2023-09-12

## 2024-07-06 NOTE — PROGRESS NOTES
[facility] Heart and Vascular Cardiology    Patient Name: Kathya Hassan  Patient : 1950      Scribe Attestation  By signing my name below, I, Amina Stout   attest that this documentation has been prepared under the direction and in the presence of Abdias Mancilla DO.      Reason for visit:  This is a 74-year-old female here for follow-up regarding her history of coronary artery calcification, calcification of the mitral annulus, hypertension, dyslipidemia, morbid obesity, and tobacco use.    HPI:  This is a 74-year-old female here for follow-up regarding her history of coronary artery calcification, calcification of the mitral annulus, hypertension, dyslipidemia, morbid obesity, and tobacco use.  The patient was last evaluated by me in 2023.  At that visit she was doing reasonably well and I asked that she follow-up in 1 year.  CMP done in 2024 showed normal serum sodium and potassium with a serum creatinine 0.55.  Lipid panel done in 2023 showed an LDL cholesterol 49 and triglycerides of 140 while on rosuvastatin 40 mg daily. ECG done today showed sinus rhythm with a heart rate of 67 bpm.  The patient reports that she has been feeling generally well from the cardiac standpoint. She denies any new chest pain, shortness of breath, palpitations and lightheadedness, but reports slightly worsening lower extremity edema. She states that she had an unsuccessful attempt to quit smoking cigarettes since her last visit. She also reports that she has not been monitoring her blood pressure regularly at home. She states that she takes all of her medications as prescribed. During my exam, he was resting comfortably on the exam table.            Assessment/Plan:   1. Coronary artery calcification  The patient has a history of coronary artery calcification with a total score of 2578.58.  ECG done today showed sinus rhythm with a heart rate of 67 bpm.    She denies anginal chest  discomfort.  Blood pressure appears moderately controlled on exam today.  She should continue current antihypertensive medications.   Nuclear stress done in June 2023 showed normal perfusion with a calculated ejection fraction of 70%.   Echocardiogram done in June 2023 showed normal left ventricular systolic function with an ejection fraction of 55 to 60%, normal right ventricular systolic function, mild mitral annular calcification, mild mitral valve regurgitation.  Recent lab works as noted in the HPI.  Lipid panel done in September 2023 showed an LDL cholesterol 49 and triglycerides of 140 while on rosuvastatin 40 mg daily.  Please see lifestyle recommendations below.   Follow up in 1 year and sooner if necessary.      2. Calcification of the mitral annulus  The patient was noted to have calcification of the mitral annulus on CT cardiac scoring.  Echocardiogram and nuclear stress test as noted above.  Continue risk factor modification.      3. Hypertension  The patient has a history of hypertension and blood pressure appears moderately controlled on exam today.   She should continue her current antihypertensive medications and monitor her blood pressure at home.  Continue risk factor modification.      4. Dyslipidemia  Lipid panel done in September 2023 showed an LDL cholesterol 49 and triglycerides of 140 while on rosuvastatin 40 mg daily.  Please see lifestyle recommendations below.      5. Morbid obesity  Please see lifestyle recommendations below.      6. Tobacco use  The patient is a current smoker. She states that she had an unsuccessful attempt to quit smoking cigarettes since her last visit.   I discussed with her the importance of smoking cessation as well as several strategies to assist her in quitting smoking.    7. Lower extremity edema  The patient has 1+ pitting bilateral lower extremity edema on exam today.  Suggested diuretic therapy which she declined at this time.  I discussed with her the  importance of following a low-sodium heart healthy diet as well as weight loss, wearing compression stockings and elevating legs when seated.       Order:   Suggest diuretic therapy -patient declined.  Follow-up in 1 year.    Lifestyle Recommendations  I recommend a whole-food plant-based diet, an eating pattern that encourages the consumption of unrefined plant foods (such as fruits, vegetables, tubers, whole grains, legumes, nuts and seeds) and discourages meats, dairy products, eggs and processed foods.     The AHA/ACC recommends that the patient consume a dietary pattern that emphasizes intake of vegetables, fruits, and whole grains; includes low-fat dairy products, poultry, fish, legumes, non-tropical vegetable oils, and nuts; and limits intake of sodium, sweets, sugar-sweetened beverages, and red meats.  Adapt this dietary pattern to appropriate calorie requirements (a 500-750 kcal/day deficit to loose weight), personal and cultural food preferences, and nutrition therapy for other medical conditions (including diabetes).  Achieve this pattern by following plans such as the [place] Mediterranean, DASH dietary pattern, or AHA diet.     Engage in 2 hours and 30 minutes per week of moderate-intensity physical activity, or 1 hour and 15 minutes (75 minutes) per week of vigorous-intensity aerobic physical activity, or an equivalent combination of moderate and vigorous-intensity aerobic physical activity. Aerobic activity should be performed in episodes of at least 10 minutes preferably spread throughout the week.     Adhering to a heart healthy diet, regular exercise habits, avoidance of tobacco products, and maintenance of a healthy weight are crucial components of their heart disease risk reduction.     Any positive review of systems not specifically addressed in the office visit today should be evaluated and treated by the patients primary care physician or in an emergency department if necessary     Patient was  notified that results from ordered tests will be called to the patient if it changes current management; it will otherwise be discussed at a future appointment and available on [facility].     Thank you for allowing me to participate in the care of this patient.        This document was generated using the assistance of voice recognition software. If there are any errors of spelling, grammar, syntax, or meaning; please feel free to contact me directly for clarification.    Past Medical History:  She has a past medical history of Disorder of the skin and subcutaneous tissue, unspecified (06/06/2019), Encounter for screening for malignant neoplasm of cervix (05/02/2019), Encounter for screening for malignant neoplasm of colon (05/20/2019), Encounter for screening for osteoporosis (05/02/2019), and Persons encountering health services in other specified circumstances (05/02/2019).    Past Surgical History:  She has no past surgical history on file.      Social History:  She reports that she has been smoking cigarettes. She started smoking about 51 years ago. She has a 51.5 pack-year smoking history. She has never used smokeless tobacco. She reports that she does not currently use alcohol. She reports that she does not use drugs.    Family History:  Family History   Problem Relation Name Age of Onset    Prostate cancer Father          Allergies:  Codeine    Outpatient Medications:  Current Outpatient Medications   Medication Instructions    amLODIPine (NORVASC) 5 mg, oral, Daily    chlorthalidone (HYGROTON) 50 mg, oral, Daily    potassium chloride CR 20 mEq ER tablet 40 mEq, oral, Daily    rosuvastatin (CRESTOR) 40 mg, oral, Daily        ROS:  A 14 point review of systems was done and is negative other than as stated in HPI    Vitals:      4/14/2022    11:19 AM 10/31/2022    11:22 AM 3/20/2023    10:45 AM 5/11/2023     2:56 PM 7/7/2023    12:25 PM 9/12/2023    10:58 AM 3/12/2024    10:29 AM   Vitals   Systolic   128 118  "140 126 124   Diastolic   80 84 78 74 82   Heart Rate   67 90 70 68 76   Temp   36.4 °C (97.5 °F)   36.1 °C (97 °F) 36.3 °C (97.4 °F)   Height (in) 1.676 m (5' 6\") 1.676 m (5' 6\") 1.676 m (5' 6\") 1.676 m (5' 6\") 1.676 m (5' 6\")  1.676 m (5' 6\")   Weight (lb) 260 260.25 223 220 218.13 228 240   BMI 41.97 kg/m2 42.01 kg/m2 35.99 kg/m2 35.51 kg/m2 35.21 kg/m2 36.8 kg/m2 38.74 kg/m2   BSA (m2) 2.34 m2 2.34 m2 2.17 m2 2.16 m2 2.15 m2 2.19 m2 2.25 m2        Physical Exam:   Constitutional: Cooperative, in no acute distress, alert, appears stated age.   Skin: Skin color, texture, turgor normal. No rashes or lesions.   Head: Normocephalic. No masses, lesions, tenderness or abnormalities   Eyes: Extraocular movements are grossly intact.   Mouth and throat: Mucous membranes moist   Neck: Neck supple, no carotid bruits, no JVD   Respiratory: Lungs clear to auscultation, no wheezing or rhonchi, no use of accessory muscles   Chest wall: No scars, normal excursion with respiration   Cardiovascular: Regular rhythm without murmur, gallop, or rubs   Gastrointestinal: Abdomen soft, nontender. Bowel sounds normal. Morbidly obese.  Musculoskeletal: Strength equal in upper extremities   Extremities:  1+ pitting edema   Neurologic: Sensation grossly intact, alert and oriented x3    Intake/Output:   No intake/output data recorded.    Outpatient Medications  Current Outpatient Medications on File Prior to Visit   Medication Sig Dispense Refill    amLODIPine (Norvasc) 5 mg tablet Take 1 tablet (5 mg) by mouth once daily. 90 tablet 1    chlorthalidone (Hygroton) 50 mg tablet Take 1 tablet (50 mg) by mouth once daily. 90 tablet 1    potassium chloride CR 20 mEq ER tablet Take 2 tablets (40 mEq) by mouth once daily. 180 tablet 1    rosuvastatin (Crestor) 40 mg tablet Take 1 tablet (40 mg) by mouth once daily. 90 tablet 3     No current facility-administered medications on file prior to visit.       Labs: (past 26 weeks)  Recent Results (from " the past 4368 hour(s))   Comprehensive Metabolic Panel    Collection Time: 03/04/24  7:46 AM   Result Value Ref Range    Glucose 89 74 - 99 mg/dL    Sodium 140 136 - 145 mmol/L    Potassium 3.5 3.5 - 5.3 mmol/L    Chloride 100 98 - 107 mmol/L    Bicarbonate 31 21 - 32 mmol/L    Anion Gap 13 10 - 20 mmol/L    Urea Nitrogen 18 6 - 23 mg/dL    Creatinine 0.55 0.50 - 1.05 mg/dL    eGFR >90 >60 mL/min/1.73m*2    Calcium 9.7 8.6 - 10.3 mg/dL    Albumin 4.1 3.4 - 5.0 g/dL    Alkaline Phosphatase 76 33 - 136 U/L    Total Protein 6.7 6.4 - 8.2 g/dL    AST 20 9 - 39 U/L    Bilirubin, Total 1.0 0.0 - 1.2 mg/dL    ALT 14 7 - 45 U/L       ECG  No results found for this or any previous visit (from the past 4464 hour(s)).    Echocardiogram  No results found for this or any previous visit from the past 1095 days.      CV Studies:  EKG: No results found for this or any previous visit (from the past 4464 hour(s)).  Echocardiogram:   [place] [address]  Phone [phone] Fax [phone]    TRANSTHORACIC ECHOCARDIOGRAM REPORT      Patient Name:     ROCIO Huddleston Physician:   [id number] Abdias Mancilla DO  Study Date:       6/8/2023       Referring Physician: ABDIAS MANCILLA  MRN/PID:          [id number]       PCP:  Accession/Order#: [id number]   Department Location: [facility] Echo Lab  YOB: 1950       Fellow:  Gender:           F              Nurse:  Admit Date:                      Sonographer:         Mere Knowles RDCS  Admission Status: Outpatient     Additional Staff:  Height:           167.64 cm      CC Report to:  Weight:           99.79 kg       Study Type:          Echocardiogram  BSA:              2.08 m2  Blood Pressure: 118 /84 mmHg    Diagnosis/ICD: I25.10-Atherosclerotic heart disease of native coronary artery  without angina pectoris; I34.81-Nonrheumatic mitral valve annulus  calcification  Indication:    CAD  Procedure/CPT:  Echo Complete w Full [id number]    Patient History:  Pertinent History: Dyslipidemia and HTN.    Study Detail: The following Echo studies were performed: 2D, M-Mode, Doppler and  color flow.      PHYSICIAN INTERPRETATION:  Left Ventricle: Left ventricular systolic function is normal, with an estimated ejection fraction of 55-60%. There are no regional wall motion abnormalities. The left ventricular cavity size is normal. Spectral Doppler shows a normal pattern of left ventricular diastolic filling. Basal septal hypertrophy.  Left Atrium: The left atrium is normal in size.  Right Ventricle: The right ventricle is normal in size. There is normal right ventricular global systolic function.  Right Atrium: The right atrium is normal in size.  Aortic Valve: The aortic valve is trileaflet. There is trivial aortic valve regurgitation. The peak instantaneous gradient of the aortic valve is 6.2 mmHg. The mean gradient of the aortic valve is 3.1 mmHg.  Mitral Valve: The mitral valve is normal in structure. There is mild mitral annular calcification. There is mild mitral valve regurgitation.  Tricuspid Valve: The tricuspid valve is structurally normal. There is trace tricuspid regurgitation.  Pulmonic Valve: The pulmonic valve is structurally normal. There is no indication of pulmonic valve regurgitation.  Pericardium: There is no pericardial effusion noted.  Aorta: The aortic root is normal.      CONCLUSIONS:  1. Left ventricular systolic function is normal with a 55-60% estimated ejection fraction.    QUANTITATIVE DATA SUMMARY:  2D MEASUREMENTS:  Normal Ranges:  IVSd:          0.97 cm   (0.6-1.1cm)  LVPWd:         0.96 cm   (0.6-1.1cm)  LVIDd:         4.77 cm   (3.9-5.9cm)  LVIDs:         2.45 cm  LV Mass Index: 77.2 g/m2  LV % FS        48.7 %    LA VOLUME:  Normal Ranges:  LA Vol A4C:        61.1 ml    (22+/-6mL/m2)  LA Vol A2C:        55.0 ml  LA Vol BP:         59.5 ml  LA Vol Index A4C:  29.3 ml/m2  LA Vol Index A2C:   26.4 ml/m2  LA Vol Index BP:   28.6 ml/m2  LA Area A4C:       19.7 cm2  LA Area A2C:       19.2 cm2  LA Major Axis A4C: 5.4 cm  LA Major Axis A2C: 5.7 cm  LA Volume Index:   28.6 ml/m2  LA Vol A4C:        57.5 ml  LA Vol A2C:        52.9 ml    RA VOLUME BY A/L METHOD:  Normal Ranges:  RA Area A4C: 16.9 cm2    AORTA MEASUREMENTS:  Normal Ranges:  Ao Sinus, d: 3.20 cm (2.1-3.5cm)  Ao STJ, d:   2.60 cm (1.7-3.4cm)  Asc Ao, d:   3.00 cm (2.1-3.4cm)    LV SYSTOLIC FUNCTION BY 2D PLANIMETRY (MOD):  Normal Ranges:  EF-A4C View: 61.7 % (>=55%)  EF-A2C View: 68.5 %  EF-Biplane:  64.8 %    LV DIASTOLIC FUNCTION:  Normal Ranges:  MV Peak E:        0.78 m/s    (0.7-1.2 m/s)  MV Peak A:        0.74 m/s    (0.42-0.7 m/s)  E/A Ratio:        1.06        (1.0-2.2)  MV e'             0.08 m/s    (>8.0)  MV lateral e'     0.09 m/s  MV medial e'      0.08 m/s  MV A Dur:         128.45 msec  E/e' Ratio:       9.16        (<8.0)  PulmV A Revs Dur: 148.43 msec    MITRAL VALVE:  Normal Ranges:  MV DT: 238 msec (150-240msec)    AORTIC VALVE:  Normal Ranges:  AoV Vmax:                1.25 m/s (<=1.7m/s)  AoV Peak P.2 mmHg (<20mmHg)  AoV Mean PG:             3.1 mmHg (1.7-11.5mmHg)  LVOT Max Ivan:            1.20 m/s (<=1.1m/s)  AoV VTI:                 30.28 cm (18-25cm)  LVOT VTI:                30.46 cm  LVOT Diameter:           1.97 cm  (1.8-2.4cm)  AoV Area, VTI:           3.08 cm2 (2.5-5.5cm2)  AoV Area,Vmax:           2.95 cm2 (2.5-4.5cm2)  AoV Dimensionless Index: 1.01    RIGHT VENTRICLE:  RV 1   3.2 cm  RV 2   2.4 cm  RV 3   6.7 cm  TAPSE: 24.6 mm  RV s'  0.13 m/s    TRICUSPID VALVE/RVSP:  Normal Ranges:  Peak TR Velocity: 2.66 m/s  RV Syst Pressure: 31.3 mmHg (< 30mmHg)  TV E Vmax:        0.40 m/s  (0.3-0.7m/s)  TV A Vmax:        0.36 m/s  IVC Diam:         1.60 cm  TV e'             0.1 m/s    Pulmonary Veins:  PulmV A Revs Dur: 148.43 msec    AORTA:  Asc Ao Diam 2.98 cm      [id number] Abdias Mancilla DO  Electronically  signed on 2023 at 9:32:04 AM         Final     Stress Testing TANYA(TSP5426:1:1825): No results found for this or any previous visit from the past 1825 days.    Cardiac Catheterization: No results found for this or any previous visit from the past 1825 days.  No results found for this or any previous visit from the past 3650 days.     Cardiac Scoring:   CT cardiac scoring wo IV contrast     Narrative  Interpreted By:  ASPEN BRONSON MD  MRN: [id number]  Patient Name: ROCIO VALENZUELA    STUDY:  CT CARDIAC SCORING;  3/31/2023 7:56 am    INDICATION:  cardiac screening.    COMPARISON:  None.    ACCESSION NUMBER(S):  [id number]    ORDERING CLINICIAN:  JACKIE YUNG    TECHNIQUE:  Using prospective ECG gating, CT scan of the coronary arteries was  performed without intravenous contrast. Coronary calcium scoring  was  performed according to the method of Agatston.    FINDINGS:  The score and distribution of calcium in the coronary arteries is as  follows:    Left Main Coronary: 0.  Left Anterior Descendin.09.  Left Circumflex: 303.69.  Right Coronary Artery: 1802.8.    Total: 2578.58.    Emphysematous changes seen of the lungs. Calcified granuloma is seen  in the right lower lobe.    The aorta is without aneurysmal dilatation evident.Calcified  atheromatous disease is seen of the aorta.    The heart is not enlarged enlarged. There is calcification of the  mitral annulus. Pericardial effusion is evident.    Calcified granulomas are seen in the mediastinum.    Structures in the visualized upper abdomen are grossly unremarkable.    Impression  1. Coronary artery calcium score of 2578.58*.  2. Calcification of the mitral annulus. Echocardiogram may be helpful.  3. Emphysematous change of the lungs. Risk stratification for  pulmonary malignancy may be helpful. The patient may be eligible for  low-dose lung cancer screening CT.    *Coronary artery calcium scoring may be helpful in predicting the  risk for future  "coronary heart disease events.  According to the  American College of Cardiology Foundation Clinical Expert Consensus  Task Force, such testing provides important prognostic information in  patients with more than one coronary heart disease risk factor. The  coronary artery calcium score correlates with the annual risk of a  non-fatal myocardial infarction or coronary heart disease death.    Coronary artery score            Annual Risk    0-99                             0.4%  100-399                        1.3%  >400                            2.4%    These three \"breakpoints\" correspond to lower, intermediate and high  risk states for future coronary events.  Such information should be  used, along with appropriate clinical judgment, to make decisions  regarding the intensity of risk factor management strategies to treat  blood lipids and to modify other non-lipid coronary risk factors.    Reference: Countyline P et al. Circulation.  2007; 115:402-426    AAA : No results found for this or any previous visit from the past 1825 days.    OTHER: No results found for this or any previous visit from the past 1825 days.    LAST IMAGING RESULTS  XR DEXA bone density  Narrative: Interpreted By:  Saul Gardiner,   STUDY:  DEXA BONE DENSITY4/30/2024 8:08 am      INDICATION:  Signs/Symptoms:recheck osteopenia. The patient is a 74 y/o  year old  F.      COMPARISON:  09/14/2021      ACCESSION NUMBER(S):  [id number]      ORDERING CLINICIAN:  JACKIE YUNG      TECHNIQUE:  DEXA BONE DENSITY      FINDINGS:  SPINE L1-L4  Bone Mineral Density: 1.202  T-Score 1.4  Z-Score 3.7  Bone Mineral Density change vs baseline (%):  2.5  Bone Mineral Density change vs previous (%): 2.8      LEFT FEMUR -TOTAL  Bone Mineral Density: 0.819  T-Score -1   Z-Score  0.7  Bone Mineral Density change vs baseline (%): -11.1  Bone Mineral Density change vs previous (%): -6.5      LEFT FEMUR -NECK  Bone Mineral Density: 0.673  T-Score -1.6  Z-Score " 0.4          World Health Organization (WHO) criteria for post-menopausal,   Women:  Normal:         T-score at or above -1 SD  Osteopenia:   T-score between -1 and -2.5 SD  Osteoporosis: T-score at or below -2.5 SD          10-year Fracture Risk (%):  Major Osteoporotic Fracture  18  Hip Fracture                        10      Note:  If no FRAX score is reported, it is because:  Some T-score for Spine Total or Hip Total or Femoral Neck at or below  -2.5      This exam was performed at [facility] [place] on a [employer]  [facility] Dexa Unit.      Impression: DEXA:  According to World Health Organization criteria,  classification is low bone mass (osteopenia)      Followup recommended in two years or sooner as clinically warranted.      All images and detailed analysis are available on the  Radiology  PACS.      MACRO:  None      Signed by: Saul Gardiner 4/30/2024 6:08 PM  Dictation workstation:   [id number]    Problem List Items Addressed This Visit       Mixed hyperlipidemia (Chronic)    Morbid obesity (Multi) (Chronic)    Essential hypertension (Chronic)    Coronary artery calcification - Primary    Overview     - Calcium score 2578.58 in 3/2023  - S/p consult with Dr. Mancilla in 7/2023.          Nonrheumatic mitral (valve) annulus calcification          Abdias Mancilla DO, FACC, FACOI

## 2024-07-12 ENCOUNTER — OFFICE VISIT (OUTPATIENT)
Dept: CARDIOLOGY | Facility: CLINIC | Age: 74
End: 2024-07-12
Payer: MEDICARE

## 2024-07-12 VITALS
DIASTOLIC BLOOD PRESSURE: 78 MMHG | HEART RATE: 67 BPM | BODY MASS INDEX: 38.41 KG/M2 | HEIGHT: 66 IN | WEIGHT: 239 LBS | SYSTOLIC BLOOD PRESSURE: 140 MMHG

## 2024-07-12 DIAGNOSIS — R60.0 LOWER EXTREMITY EDEMA: ICD-10-CM

## 2024-07-12 DIAGNOSIS — E66.01 MORBID OBESITY (MULTI): Chronic | ICD-10-CM

## 2024-07-12 DIAGNOSIS — I25.10 CORONARY ARTERY CALCIFICATION: Primary | ICD-10-CM

## 2024-07-12 DIAGNOSIS — I25.84 CORONARY ARTERY CALCIFICATION: Primary | ICD-10-CM

## 2024-07-12 DIAGNOSIS — I34.81 NONRHEUMATIC MITRAL (VALVE) ANNULUS CALCIFICATION: ICD-10-CM

## 2024-07-12 DIAGNOSIS — E78.2 MIXED HYPERLIPIDEMIA: Chronic | ICD-10-CM

## 2024-07-12 DIAGNOSIS — I10 ESSENTIAL HYPERTENSION: Chronic | ICD-10-CM

## 2024-07-12 PROCEDURE — 93000 ELECTROCARDIOGRAM COMPLETE: CPT | Performed by: INTERNAL MEDICINE

## 2024-07-12 PROCEDURE — 3077F SYST BP >= 140 MM HG: CPT | Performed by: INTERNAL MEDICINE

## 2024-07-12 PROCEDURE — 3078F DIAST BP <80 MM HG: CPT | Performed by: INTERNAL MEDICINE

## 2024-07-12 PROCEDURE — 99213 OFFICE O/P EST LOW 20 MIN: CPT | Performed by: INTERNAL MEDICINE

## 2024-07-12 PROCEDURE — 1159F MED LIST DOCD IN RCRD: CPT | Performed by: INTERNAL MEDICINE

## 2024-09-03 ENCOUNTER — LAB (OUTPATIENT)
Dept: LAB | Facility: LAB | Age: 74
End: 2024-09-03
Payer: MEDICARE

## 2024-09-03 DIAGNOSIS — I10 ESSENTIAL HYPERTENSION: Chronic | ICD-10-CM

## 2024-09-03 DIAGNOSIS — E87.6 HYPOKALEMIA: Primary | ICD-10-CM

## 2024-09-03 DIAGNOSIS — E78.2 MIXED HYPERLIPIDEMIA: Chronic | ICD-10-CM

## 2024-09-03 LAB
ALBUMIN SERPL BCP-MCNC: 4.1 G/DL (ref 3.4–5)
ALP SERPL-CCNC: 81 U/L (ref 33–136)
ALT SERPL W P-5'-P-CCNC: 12 U/L (ref 7–45)
ANION GAP SERPL CALC-SCNC: 13 MMOL/L (ref 10–20)
AST SERPL W P-5'-P-CCNC: 19 U/L (ref 9–39)
BILIRUB SERPL-MCNC: 1.1 MG/DL (ref 0–1.2)
BUN SERPL-MCNC: 17 MG/DL (ref 6–23)
CALCIUM SERPL-MCNC: 9.2 MG/DL (ref 8.6–10.3)
CHLORIDE SERPL-SCNC: 97 MMOL/L (ref 98–107)
CHOLEST SERPL-MCNC: 142 MG/DL (ref 0–199)
CHOLESTEROL/HDL RATIO: 2.2
CO2 SERPL-SCNC: 31 MMOL/L (ref 21–32)
CREAT SERPL-MCNC: 0.66 MG/DL (ref 0.5–1.05)
EGFRCR SERPLBLD CKD-EPI 2021: >90 ML/MIN/1.73M*2
GLUCOSE SERPL-MCNC: 87 MG/DL (ref 74–99)
HDLC SERPL-MCNC: 63.8 MG/DL
LDLC SERPL CALC-MCNC: 49 MG/DL
NON HDL CHOLESTEROL: 78 MG/DL (ref 0–149)
POTASSIUM SERPL-SCNC: 3.2 MMOL/L (ref 3.5–5.3)
PROT SERPL-MCNC: 6.5 G/DL (ref 6.4–8.2)
SODIUM SERPL-SCNC: 138 MMOL/L (ref 136–145)
TRIGL SERPL-MCNC: 147 MG/DL (ref 0–149)
VLDL: 29 MG/DL (ref 0–40)

## 2024-09-03 PROCEDURE — 36415 COLL VENOUS BLD VENIPUNCTURE: CPT

## 2024-09-03 PROCEDURE — 80061 LIPID PANEL: CPT

## 2024-09-03 PROCEDURE — 80053 COMPREHEN METABOLIC PANEL: CPT

## 2024-09-04 ENCOUNTER — TELEPHONE (OUTPATIENT)
Dept: PRIMARY CARE | Facility: CLINIC | Age: 74
End: 2024-09-04
Payer: MEDICARE

## 2024-09-04 NOTE — TELEPHONE ENCOUNTER
Spoke to pt and informed of BMANTONY msg, she has not been taking pot daily, she will start being more deligant about taking pot 2 daily and will get BW next week.

## 2024-09-04 NOTE — TELEPHONE ENCOUNTER
----- Message from Lisa Lewis sent at 9/3/2024  7:37 PM EDT -----  Please let Kathya know that her potassium was low on her recent labs. Has she been taking the 2 tabs of the 20 mEq potassium daily? If she has been taking them, then she needs to increase to 3 tablets per day. Please have repeat lab drawn early next week to make sure levels responding.

## 2024-09-09 ENCOUNTER — LAB (OUTPATIENT)
Dept: LAB | Facility: LAB | Age: 74
End: 2024-09-09
Payer: MEDICARE

## 2024-09-09 DIAGNOSIS — R41.89 COGNITIVE CHANGE: ICD-10-CM

## 2024-09-09 DIAGNOSIS — Z13.29 SCREENING FOR ENDOCRINE DISORDER: ICD-10-CM

## 2024-09-09 DIAGNOSIS — E87.6 HYPOKALEMIA: ICD-10-CM

## 2024-09-09 LAB
ANION GAP SERPL CALC-SCNC: 13 MMOL/L (ref 10–20)
BUN SERPL-MCNC: 15 MG/DL (ref 6–23)
CALCIUM SERPL-MCNC: 9.1 MG/DL (ref 8.6–10.3)
CHLORIDE SERPL-SCNC: 97 MMOL/L (ref 98–107)
CO2 SERPL-SCNC: 31 MMOL/L (ref 21–32)
CREAT SERPL-MCNC: 0.64 MG/DL (ref 0.5–1.05)
EGFRCR SERPLBLD CKD-EPI 2021: >90 ML/MIN/1.73M*2
GLUCOSE SERPL-MCNC: 84 MG/DL (ref 74–99)
POTASSIUM SERPL-SCNC: 3.5 MMOL/L (ref 3.5–5.3)
SODIUM SERPL-SCNC: 137 MMOL/L (ref 136–145)

## 2024-09-09 PROCEDURE — 80048 BASIC METABOLIC PNL TOTAL CA: CPT

## 2024-09-09 PROCEDURE — 36415 COLL VENOUS BLD VENIPUNCTURE: CPT

## 2024-09-13 ENCOUNTER — APPOINTMENT (OUTPATIENT)
Dept: PRIMARY CARE | Facility: CLINIC | Age: 74
End: 2024-09-13
Payer: MEDICARE

## 2024-09-13 VITALS
TEMPERATURE: 96.9 F | OXYGEN SATURATION: 97 % | HEART RATE: 86 BPM | WEIGHT: 235 LBS | SYSTOLIC BLOOD PRESSURE: 118 MMHG | BODY MASS INDEX: 37.93 KG/M2 | DIASTOLIC BLOOD PRESSURE: 70 MMHG

## 2024-09-13 DIAGNOSIS — Z23 NEEDS FLU SHOT: ICD-10-CM

## 2024-09-13 DIAGNOSIS — T50.905A DRUG-INDUCED HYPOKALEMIA: ICD-10-CM

## 2024-09-13 DIAGNOSIS — E78.2 MIXED HYPERLIPIDEMIA: Chronic | ICD-10-CM

## 2024-09-13 DIAGNOSIS — Z12.11 SCREEN FOR COLON CANCER: ICD-10-CM

## 2024-09-13 DIAGNOSIS — I10 ESSENTIAL HYPERTENSION: Primary | Chronic | ICD-10-CM

## 2024-09-13 DIAGNOSIS — Z13.29 SCREENING FOR ENDOCRINE DISORDER: ICD-10-CM

## 2024-09-13 DIAGNOSIS — E55.9 VITAMIN D DEFICIENCY: ICD-10-CM

## 2024-09-13 DIAGNOSIS — R41.89 COGNITIVE CHANGE: ICD-10-CM

## 2024-09-13 DIAGNOSIS — M85.89 OSTEOPENIA OF MULTIPLE SITES: Chronic | ICD-10-CM

## 2024-09-13 DIAGNOSIS — F17.210 NICOTINE DEPENDENCE, CIGARETTES, UNCOMPLICATED: Chronic | ICD-10-CM

## 2024-09-13 DIAGNOSIS — E87.6 DRUG-INDUCED HYPOKALEMIA: ICD-10-CM

## 2024-09-13 DIAGNOSIS — E53.8 B12 DEFICIENCY: Primary | ICD-10-CM

## 2024-09-13 LAB
TSH SERPL-ACNC: 2.31 MIU/L (ref 0.44–3.98)
VIT B12 SERPL-MCNC: 179 PG/ML (ref 211–911)

## 2024-09-13 PROCEDURE — 3074F SYST BP LT 130 MM HG: CPT | Performed by: FAMILY MEDICINE

## 2024-09-13 PROCEDURE — 99215 OFFICE O/P EST HI 40 MIN: CPT | Performed by: FAMILY MEDICINE

## 2024-09-13 PROCEDURE — G0008 ADMIN INFLUENZA VIRUS VAC: HCPCS | Performed by: FAMILY MEDICINE

## 2024-09-13 PROCEDURE — 3078F DIAST BP <80 MM HG: CPT | Performed by: FAMILY MEDICINE

## 2024-09-13 PROCEDURE — 1159F MED LIST DOCD IN RCRD: CPT | Performed by: FAMILY MEDICINE

## 2024-09-13 PROCEDURE — 90662 IIV NO PRSV INCREASED AG IM: CPT | Performed by: FAMILY MEDICINE

## 2024-09-13 RX ORDER — CHLORTHALIDONE 50 MG/1
50 TABLET ORAL DAILY
Qty: 90 TABLET | Refills: 1 | Status: SHIPPED | OUTPATIENT
Start: 2024-09-13

## 2024-09-13 RX ORDER — POTASSIUM CHLORIDE 20 MEQ/1
40 TABLET, EXTENDED RELEASE ORAL DAILY
Qty: 180 TABLET | Refills: 1 | Status: SHIPPED | OUTPATIENT
Start: 2024-09-13

## 2024-09-13 RX ORDER — AMLODIPINE BESYLATE 5 MG/1
5 TABLET ORAL DAILY
Qty: 90 TABLET | Refills: 1 | Status: SHIPPED | OUTPATIENT
Start: 2024-09-13

## 2024-09-13 ASSESSMENT — ENCOUNTER SYMPTOMS
COUGH: 0
DECREASED CONCENTRATION: 1
NERVOUS/ANXIOUS: 0
DIARRHEA: 0
VOMITING: 0
CHILLS: 0
NAUSEA: 0
ABDOMINAL PAIN: 0
DYSPHORIC MOOD: 0
FEVER: 0
SHORTNESS OF BREATH: 0
CONFUSION: 0

## 2024-09-13 NOTE — PROGRESS NOTES
Subjective   Patient ID: Kathya Hassan is a 74 y.o. female who presents for Hypertension and Hyperlipidemia (Review bw ).    Kathya has been feeling well. Has noticed some cognitive changes. Does not remember things as well as she used to. Having trouble with multitasking. Recently confused the day of her scheduled appointment in office.     Blood pressure has been stable.     She has been skipping her potassium supplement due to issue with having to take it with food. Got out of the habit but has started taking it again after potassium low on recent labs.          Review of Systems   Constitutional:  Negative for chills and fever.   Respiratory:  Negative for cough and shortness of breath.    Cardiovascular:  Negative for chest pain.   Gastrointestinal:  Negative for abdominal pain, diarrhea, nausea and vomiting.   Psychiatric/Behavioral:  Positive for decreased concentration. Negative for behavioral problems, confusion and dysphoric mood. The patient is not nervous/anxious.        Objective   /70   Pulse 86   Temp 36.1 °C (96.9 °F)   Wt 107 kg (235 lb)   SpO2 97%   BMI 37.93 kg/m²     Physical Exam  Constitutional:       General: She is not in acute distress.     Appearance: Normal appearance.   HENT:      Head: Normocephalic.      Mouth/Throat:      Mouth: Mucous membranes are moist.   Eyes:      Extraocular Movements: Extraocular movements intact.      Conjunctiva/sclera: Conjunctivae normal.   Cardiovascular:      Rate and Rhythm: Normal rate and regular rhythm.      Heart sounds: No murmur heard.  Pulmonary:      Breath sounds: No wheezing or rhonchi.   Musculoskeletal:      Cervical back: Neck supple.   Skin:     General: Skin is warm and dry.   Neurological:      Mental Status: She is alert.   Psychiatric:         Mood and Affect: Mood normal.         Behavior: Behavior normal.         Thought Content: Thought content normal.         Judgment: Judgment normal.         Assessment/Plan   Problem  List Items Addressed This Visit             ICD-10-CM    Mixed hyperlipidemia (Chronic) E78.2     LDL at goal.          Relevant Orders    Lipid Panel    Vitamin D deficiency E55.9     Check vit D with next labs.          Relevant Orders    Vitamin D 25-Hydroxy,Total (for eval of Vitamin D levels)    Nicotine dependence, cigarettes, uncomplicated (Chronic) F17.210     Patient not ready to quit.         Essential hypertension - Primary (Chronic) I10     Controlled.          Relevant Medications    amLODIPine (Norvasc) 5 mg tablet    chlorthalidone (Hygroton) 50 mg tablet    Other Relevant Orders    Comprehensive Metabolic Panel    Osteopenia (Chronic) M85.80    Relevant Orders    CBC and Auto Differential    Drug-induced hypokalemia (Chronic) E87.6, T50.905A    Relevant Medications    potassium chloride CR 20 mEq ER tablet    Cognitive change R41.89     MOCA score normal. Check B12 and thyroid. Patient declined brain MRI.         Relevant Orders    Vitamin B12     Other Visit Diagnoses         Codes    Needs flu shot     Z23    Relevant Orders    Flu vaccine, trivalent, preservative free, HIGH-DOSE, age 65y+ (Fluzone) (Completed)    Screen for colon cancer     Z12.11    Relevant Orders    Colonoscopy Screening; Average Risk Patient    Screening for endocrine disorder     Z13.29    Relevant Orders    TSH with reflex to Free T4 if abnormal          Time Based Billing:  - Prep Time on Date of Patient Encounter:  1 minutes  - Time Directly with Patient/Family/Caregiver:   30 minutes  - Documentation Time:   10 minutes    Total Minutes:  41

## 2024-10-21 ENCOUNTER — HOSPITAL ENCOUNTER (OUTPATIENT)
Dept: RADIOLOGY | Facility: HOSPITAL | Age: 74
Discharge: HOME | End: 2024-10-21
Payer: MEDICARE

## 2024-10-21 DIAGNOSIS — Z12.31 ENCOUNTER FOR SCREENING MAMMOGRAM FOR MALIGNANT NEOPLASM OF BREAST: ICD-10-CM

## 2024-10-21 PROCEDURE — 77063 BREAST TOMOSYNTHESIS BI: CPT | Performed by: RADIOLOGY

## 2024-10-21 PROCEDURE — 77067 SCR MAMMO BI INCL CAD: CPT

## 2024-10-21 PROCEDURE — 77067 SCR MAMMO BI INCL CAD: CPT | Performed by: RADIOLOGY

## 2024-10-31 ENCOUNTER — TELEPHONE (OUTPATIENT)
Dept: GASTROENTEROLOGY | Facility: CLINIC | Age: 74
End: 2024-10-31
Payer: MEDICARE

## 2024-10-31 NOTE — TELEPHONE ENCOUNTER
----- Message from Nurse Sowmya FREGOSO sent at 10/30/2024  2:06 PM EDT -----  Regarding: Open access  Open access

## 2024-11-21 DIAGNOSIS — E78.2 MIXED HYPERLIPIDEMIA: Chronic | ICD-10-CM

## 2024-11-21 RX ORDER — ROSUVASTATIN CALCIUM 40 MG/1
40 TABLET, COATED ORAL DAILY
Qty: 90 TABLET | Refills: 3 | Status: SHIPPED | OUTPATIENT
Start: 2024-11-21

## 2024-11-21 NOTE — TELEPHONE ENCOUNTER
RN called patient and notified that refill was sent. Patient verbalizes understanding.       ----- Message from Nurse Helen LALA sent at 11/20/2024 12:16 PM EST -----  Regarding: Refill  Patient is requesting a refill for her rosuvastatin to be sent to [facility] in [place]. She is requesting a call back when it is sent.

## 2025-03-05 LAB
25(OH)D3+25(OH)D2 SERPL-MCNC: 25 NG/ML (ref 30–100)
ALBUMIN SERPL-MCNC: 4.5 G/DL (ref 3.6–5.1)
ALP SERPL-CCNC: 80 U/L (ref 37–153)
ALT SERPL-CCNC: 10 U/L (ref 6–29)
ANION GAP SERPL CALCULATED.4IONS-SCNC: 11 MMOL/L (CALC) (ref 7–17)
AST SERPL-CCNC: 17 U/L (ref 10–35)
BASOPHILS # BLD AUTO: 52 CELLS/UL (ref 0–200)
BASOPHILS NFR BLD AUTO: 0.8 %
BILIRUB SERPL-MCNC: 1.2 MG/DL (ref 0.2–1.2)
BUN SERPL-MCNC: 15 MG/DL (ref 7–25)
CALCIUM SERPL-MCNC: 9.8 MG/DL (ref 8.6–10.4)
CHLORIDE SERPL-SCNC: 99 MMOL/L (ref 98–110)
CHOLEST SERPL-MCNC: 145 MG/DL
CHOLEST/HDLC SERPL: 2 (CALC)
CO2 SERPL-SCNC: 30 MMOL/L (ref 20–32)
CREAT SERPL-MCNC: 0.53 MG/DL (ref 0.6–1)
EGFRCR SERPLBLD CKD-EPI 2021: 97 ML/MIN/1.73M2
EOSINOPHIL # BLD AUTO: 33 CELLS/UL (ref 15–500)
EOSINOPHIL NFR BLD AUTO: 0.5 %
ERYTHROCYTE [DISTWIDTH] IN BLOOD BY AUTOMATED COUNT: 13.8 % (ref 11–15)
GLUCOSE SERPL-MCNC: 88 MG/DL (ref 65–99)
HCT VFR BLD AUTO: 47.1 % (ref 35–45)
HDLC SERPL-MCNC: 73 MG/DL
HGB BLD-MCNC: 15.7 G/DL (ref 11.7–15.5)
LDLC SERPL CALC-MCNC: 49 MG/DL (CALC)
LYMPHOCYTES # BLD AUTO: 1788 CELLS/UL (ref 850–3900)
LYMPHOCYTES NFR BLD AUTO: 27.5 %
MCH RBC QN AUTO: 29.1 PG (ref 27–33)
MCHC RBC AUTO-ENTMCNC: 33.3 G/DL (ref 32–36)
MCV RBC AUTO: 87.4 FL (ref 80–100)
MONOCYTES # BLD AUTO: 481 CELLS/UL (ref 200–950)
MONOCYTES NFR BLD AUTO: 7.4 %
NEUTROPHILS # BLD AUTO: 4147 CELLS/UL (ref 1500–7800)
NEUTROPHILS NFR BLD AUTO: 63.8 %
NONHDLC SERPL-MCNC: 72 MG/DL (CALC)
PLATELET # BLD AUTO: 285 THOUSAND/UL (ref 140–400)
PMV BLD REES-ECKER: 10.3 FL (ref 7.5–12.5)
POTASSIUM SERPL-SCNC: 3.4 MMOL/L (ref 3.5–5.3)
PROT SERPL-MCNC: 7.1 G/DL (ref 6.1–8.1)
RBC # BLD AUTO: 5.39 MILLION/UL (ref 3.8–5.1)
SODIUM SERPL-SCNC: 140 MMOL/L (ref 135–146)
TRIGL SERPL-MCNC: 145 MG/DL
VIT B12 SERPL-MCNC: 565 PG/ML (ref 200–1100)
WBC # BLD AUTO: 6.5 THOUSAND/UL (ref 3.8–10.8)

## 2025-03-14 ENCOUNTER — APPOINTMENT (OUTPATIENT)
Dept: PRIMARY CARE | Facility: CLINIC | Age: 75
End: 2025-03-14
Payer: MEDICARE

## 2025-03-14 VITALS
HEART RATE: 76 BPM | WEIGHT: 227 LBS | HEIGHT: 66 IN | TEMPERATURE: 97.3 F | OXYGEN SATURATION: 98 % | BODY MASS INDEX: 36.48 KG/M2 | DIASTOLIC BLOOD PRESSURE: 80 MMHG | SYSTOLIC BLOOD PRESSURE: 124 MMHG

## 2025-03-14 DIAGNOSIS — E78.2 MIXED HYPERLIPIDEMIA: Chronic | ICD-10-CM

## 2025-03-14 DIAGNOSIS — F17.210 NICOTINE DEPENDENCE, CIGARETTES, UNCOMPLICATED: Chronic | ICD-10-CM

## 2025-03-14 DIAGNOSIS — D58.2 ELEVATED HEMOGLOBIN (CMS-HCC): ICD-10-CM

## 2025-03-14 DIAGNOSIS — Z00.00 ROUTINE GENERAL MEDICAL EXAMINATION AT HEALTH CARE FACILITY: Primary | ICD-10-CM

## 2025-03-14 DIAGNOSIS — E55.9 VITAMIN D DEFICIENCY: ICD-10-CM

## 2025-03-14 DIAGNOSIS — G25.2 RESTING TREMOR: ICD-10-CM

## 2025-03-14 DIAGNOSIS — E87.6 DRUG-INDUCED HYPOKALEMIA: ICD-10-CM

## 2025-03-14 DIAGNOSIS — I10 ESSENTIAL HYPERTENSION: Chronic | ICD-10-CM

## 2025-03-14 DIAGNOSIS — E66.01 MORBID OBESITY (MULTI): Chronic | ICD-10-CM

## 2025-03-14 DIAGNOSIS — M85.89 OSTEOPENIA OF MULTIPLE SITES: Chronic | ICD-10-CM

## 2025-03-14 DIAGNOSIS — Z12.11 SCREEN FOR COLON CANCER: ICD-10-CM

## 2025-03-14 DIAGNOSIS — T50.905A DRUG-INDUCED HYPOKALEMIA: ICD-10-CM

## 2025-03-14 DIAGNOSIS — Z00.00 WELLNESS EXAMINATION: ICD-10-CM

## 2025-03-14 DIAGNOSIS — M17.0 BILATERAL PRIMARY OSTEOARTHRITIS OF KNEE: Chronic | ICD-10-CM

## 2025-03-14 PROCEDURE — 99397 PER PM REEVAL EST PAT 65+ YR: CPT | Performed by: FAMILY MEDICINE

## 2025-03-14 PROCEDURE — 3008F BODY MASS INDEX DOCD: CPT | Performed by: FAMILY MEDICINE

## 2025-03-14 PROCEDURE — 99406 BEHAV CHNG SMOKING 3-10 MIN: CPT | Performed by: FAMILY MEDICINE

## 2025-03-14 PROCEDURE — 3074F SYST BP LT 130 MM HG: CPT | Performed by: FAMILY MEDICINE

## 2025-03-14 PROCEDURE — 1159F MED LIST DOCD IN RCRD: CPT | Performed by: FAMILY MEDICINE

## 2025-03-14 PROCEDURE — 1170F FXNL STATUS ASSESSED: CPT | Performed by: FAMILY MEDICINE

## 2025-03-14 PROCEDURE — G0439 PPPS, SUBSEQ VISIT: HCPCS | Performed by: FAMILY MEDICINE

## 2025-03-14 PROCEDURE — 99214 OFFICE O/P EST MOD 30 MIN: CPT | Performed by: FAMILY MEDICINE

## 2025-03-14 PROCEDURE — 3079F DIAST BP 80-89 MM HG: CPT | Performed by: FAMILY MEDICINE

## 2025-03-14 RX ORDER — AMLODIPINE BESYLATE 5 MG/1
5 TABLET ORAL DAILY
Qty: 90 TABLET | Refills: 1 | Status: CANCELLED | OUTPATIENT
Start: 2025-03-14

## 2025-03-14 RX ORDER — CHLORTHALIDONE 25 MG/1
25 TABLET ORAL DAILY
Qty: 30 TABLET | Refills: 0 | Status: SHIPPED | OUTPATIENT
Start: 2025-03-14 | End: 2025-04-13

## 2025-03-14 RX ORDER — AMLODIPINE BESYLATE 10 MG/1
10 TABLET ORAL DAILY
Qty: 30 TABLET | Refills: 5 | Status: SHIPPED | OUTPATIENT
Start: 2025-03-14 | End: 2025-09-10

## 2025-03-14 RX ORDER — CHLORTHALIDONE 50 MG/1
50 TABLET ORAL DAILY
Qty: 90 TABLET | Refills: 1 | Status: CANCELLED | OUTPATIENT
Start: 2025-03-14

## 2025-03-14 RX ORDER — POTASSIUM CHLORIDE 20 MEQ/1
40 TABLET, EXTENDED RELEASE ORAL DAILY
Qty: 180 TABLET | Refills: 1 | Status: SHIPPED | OUTPATIENT
Start: 2025-03-14

## 2025-03-14 ASSESSMENT — ACTIVITIES OF DAILY LIVING (ADL)
BATHING: INDEPENDENT
GROCERY_SHOPPING: INDEPENDENT
DOING_HOUSEWORK: INDEPENDENT
TAKING_MEDICATION: INDEPENDENT
MANAGING_FINANCES: INDEPENDENT
DRESSING: INDEPENDENT

## 2025-03-14 ASSESSMENT — ENCOUNTER SYMPTOMS
ARTHRALGIAS: 1
NAUSEA: 0
SORE THROAT: 0
VOMITING: 0
RHINORRHEA: 0
CHILLS: 0
APPETITE CHANGE: 0
CONSTIPATION: 0
FEVER: 0
FATIGUE: 0
ABDOMINAL PAIN: 0
DIARRHEA: 0
VOICE CHANGE: 0
DYSURIA: 0
SLEEP DISTURBANCE: 0
SHORTNESS OF BREATH: 0
COUGH: 0
DIZZINESS: 0
PALPITATIONS: 0

## 2025-03-14 NOTE — ASSESSMENT & PLAN NOTE
Suspect elevated hemoglobin and hematocrit due to current tobacco abuse.  Will continue to monitor levels.  If significant change, plan hematology consult.  Orders:    CBC and Auto Differential; Future

## 2025-03-14 NOTE — ASSESSMENT & PLAN NOTE
Orders:    potassium chloride CR 20 mEq ER tablet; Take 2 tablets (40 mEq) by mouth once daily.

## 2025-03-14 NOTE — PROGRESS NOTES
"Subjective   Reason for Visit: Kathya Hassan is an 74 y.o. female here for a Medicare Wellness visit.     Past Medical, Surgical, and Family History reviewed and updated in chart.    Reviewed all medications by prescribing practitioner or clinical pharmacist (such as prescriptions, OTCs, herbal therapies and supplements) and documented in the medical record.    Kathya has been feeling well. She has been having more pain in her bilateral knees.  She knows that she needs to have that replaced, but she wants to lose more weight first.  She is not currently exercising but does want to get more active.    She is taking her blood pressure medication daily, but it often makes her urinate more often than she would like.  Is wondering if she can switch blood pressure medications to improve the side effect.    She is due for screening colonoscopy, but she has not yet scheduled.    Is currently smoking 1 pack per day. Not ready to quit.         Patient Care Team:  Lisa Lewis DO as PCP - General  Lisa Lewis DO as PCP - O Medicare Advantage PCP     Review of Systems   Constitutional:  Negative for appetite change, chills, fatigue and fever.   HENT:  Negative for congestion, rhinorrhea, sore throat and voice change.    Eyes:  Negative for visual disturbance.   Respiratory:  Negative for cough and shortness of breath.    Cardiovascular:  Negative for chest pain and palpitations.   Gastrointestinal:  Negative for abdominal pain, constipation, diarrhea, nausea and vomiting.   Genitourinary:  Negative for dysuria.   Musculoskeletal:  Positive for arthralgias.   Skin:  Negative for rash.   Neurological:  Negative for dizziness.   Psychiatric/Behavioral:  Negative for sleep disturbance.        Objective   Vitals:  /80   Pulse 76   Temp 36.3 °C (97.3 °F)   Ht 1.676 m (5' 6\")   Wt 103 kg (227 lb)   SpO2 98%   BMI 36.64 kg/m²       Physical Exam  Constitutional:       General: She is not in acute " distress.  HENT:      Head: Normocephalic and atraumatic.      Right Ear: Tympanic membrane normal.      Left Ear: Tympanic membrane normal.      Nose: No congestion or rhinorrhea.      Mouth/Throat:      Mouth: Mucous membranes are moist.      Pharynx: No oropharyngeal exudate or posterior oropharyngeal erythema.   Eyes:      Extraocular Movements: Extraocular movements intact.   Cardiovascular:      Rate and Rhythm: Normal rate and regular rhythm.      Heart sounds: No murmur heard.  Pulmonary:      Effort: Pulmonary effort is normal.      Breath sounds: No wheezing or rhonchi.   Abdominal:      General: There is no distension.      Palpations: Abdomen is soft.      Tenderness: There is no abdominal tenderness. There is no guarding or rebound.   Musculoskeletal:         General: No swelling or tenderness.      Cervical back: Neck supple.      Right lower leg: No edema.      Left lower leg: No edema.   Lymphadenopathy:      Cervical: No cervical adenopathy.   Skin:     General: Skin is warm and dry.   Neurological:      Mental Status: She is alert.      Cranial Nerves: No cranial nerve deficit.      Motor: No weakness.      Coordination: Coordination normal.      Gait: Gait normal.   Psychiatric:         Mood and Affect: Mood normal.         Behavior: Behavior normal.         Assessment & Plan  Routine general medical examination at health care facility  Medicare AWE performed.       Wellness examination  CPE performed. Colonoscopy ordered. Discussed hepatitis A and RSV vaccines, but patient declined both vaccines.       Elevated hemoglobin (CMS-HCC)  Suspect elevated hemoglobin and hematocrit due to current tobacco abuse.  Will continue to monitor levels.  If significant change, plan hematology consult.  Orders:    CBC and Auto Differential; Future    Essential hypertension  Reduce chlorthalidone to 25 mg to try to mitigate excessive urination.  Will increase amlodipine to 10 mg for blood pressure control.   Patient to monitor home blood pressures and call if readings greater than 140/90.  Orders:    amLODIPine (Norvasc) 10 mg tablet; Take 1 tablet (10 mg) by mouth once daily.    chlorthalidone (Hygroton) 25 mg tablet; Take 1 tablet (25 mg) by mouth once daily.    Comprehensive Metabolic Panel; Future    Mixed hyperlipidemia  LDL at goal.   Orders:    Lipid Panel; Future    Drug-induced hypokalemia    Orders:    potassium chloride CR 20 mEq ER tablet; Take 2 tablets (40 mEq) by mouth once daily.    Resting tremor  Patient declines neurology referral.        Osteopenia of multiple sites  Next DEXA due in 2026.        Vitamin D deficiency    Orders:    Vitamin D 25-Hydroxy,Total (for eval of Vitamin D levels); Future    Nicotine dependence, cigarettes, uncomplicated  Spent >3 min but <10 counseling on smoking cessation. Patient not ready to quit.        Bilateral primary osteoarthritis of knee  Recommend try biking to lose weight and reduce pressure on knees.        Morbid obesity (Multi)  Recommend increase physical activity.        Screen for colon cancer    Orders:    Colonoscopy Screening; Average Risk Patient; Future

## 2025-03-14 NOTE — ASSESSMENT & PLAN NOTE
Reduce chlorthalidone to 25 mg to try to mitigate excessive urination.  Will increase amlodipine to 10 mg for blood pressure control.  Patient to monitor home blood pressures and call if readings greater than 140/90.  Orders:    amLODIPine (Norvasc) 10 mg tablet; Take 1 tablet (10 mg) by mouth once daily.    chlorthalidone (Hygroton) 25 mg tablet; Take 1 tablet (25 mg) by mouth once daily.    Comprehensive Metabolic Panel; Future

## 2025-03-14 NOTE — PATIENT INSTRUCTIONS
- Please increase your amlodipine to 5mg and decrease your chlorthalidone to 25mg.    - Please check your BP a few times per week. Let Dr. HARDY know if >140/90.

## 2025-04-14 DIAGNOSIS — I10 ESSENTIAL HYPERTENSION: Chronic | ICD-10-CM

## 2025-04-15 RX ORDER — CHLORTHALIDONE 25 MG/1
25 TABLET ORAL DAILY
Qty: 90 TABLET | Refills: 1 | Status: SHIPPED | OUTPATIENT
Start: 2025-04-15

## 2025-04-28 ENCOUNTER — ANESTHESIA EVENT (OUTPATIENT)
Dept: GASTROENTEROLOGY | Facility: HOSPITAL | Age: 75
End: 2025-04-28
Payer: MEDICARE

## 2025-04-28 ENCOUNTER — HOSPITAL ENCOUNTER (OUTPATIENT)
Dept: GASTROENTEROLOGY | Facility: HOSPITAL | Age: 75
Discharge: HOME | End: 2025-04-28
Payer: MEDICARE

## 2025-04-28 ENCOUNTER — ANESTHESIA (OUTPATIENT)
Dept: GASTROENTEROLOGY | Facility: HOSPITAL | Age: 75
End: 2025-04-28
Payer: MEDICARE

## 2025-04-28 VITALS
SYSTOLIC BLOOD PRESSURE: 122 MMHG | DIASTOLIC BLOOD PRESSURE: 68 MMHG | BODY MASS INDEX: 36.48 KG/M2 | TEMPERATURE: 97 F | RESPIRATION RATE: 16 BRPM | HEIGHT: 66 IN | OXYGEN SATURATION: 96 % | HEART RATE: 73 BPM | WEIGHT: 227 LBS

## 2025-04-28 DIAGNOSIS — Z86.0100 HISTORY OF COLON POLYPS: ICD-10-CM

## 2025-04-28 DIAGNOSIS — Z12.11 ENCOUNTER FOR SCREENING COLONOSCOPY: ICD-10-CM

## 2025-04-28 PROCEDURE — 3700000002 HC GENERAL ANESTHESIA TIME - EACH INCREMENTAL 1 MINUTE

## 2025-04-28 PROCEDURE — 3700000001 HC GENERAL ANESTHESIA TIME - INITIAL BASE CHARGE

## 2025-04-28 PROCEDURE — 2500000004 HC RX 250 GENERAL PHARMACY W/ HCPCS (ALT 636 FOR OP/ED): Mod: JZ | Performed by: NURSE ANESTHETIST, CERTIFIED REGISTERED

## 2025-04-28 PROCEDURE — G0121 COLON CA SCRN NOT HI RSK IND: HCPCS | Performed by: SURGERY

## 2025-04-28 PROCEDURE — 7100000009 HC PHASE TWO TIME - INITIAL BASE CHARGE

## 2025-04-28 PROCEDURE — 7100000010 HC PHASE TWO TIME - EACH INCREMENTAL 1 MINUTE

## 2025-04-28 PROCEDURE — 45378 DIAGNOSTIC COLONOSCOPY: CPT | Performed by: SURGERY

## 2025-04-28 RX ORDER — SODIUM CHLORIDE 9 MG/ML
100 INJECTION, SOLUTION INTRAVENOUS CONTINUOUS
Status: DISCONTINUED | OUTPATIENT
Start: 2025-04-28 | End: 2025-04-29 | Stop reason: HOSPADM

## 2025-04-28 RX ORDER — SODIUM CHLORIDE, SODIUM LACTATE, POTASSIUM CHLORIDE, CALCIUM CHLORIDE 600; 310; 30; 20 MG/100ML; MG/100ML; MG/100ML; MG/100ML
20 INJECTION, SOLUTION INTRAVENOUS CONTINUOUS
Status: DISCONTINUED | OUTPATIENT
Start: 2025-04-28 | End: 2025-04-29 | Stop reason: HOSPADM

## 2025-04-28 RX ORDER — WITCH HAZEL 50 %
2000 PADS, MEDICATED (EA) TOPICAL DAILY
COMMUNITY

## 2025-04-28 RX ORDER — PROPOFOL 10 MG/ML
INJECTION, EMULSION INTRAVENOUS AS NEEDED
Status: DISCONTINUED | OUTPATIENT
Start: 2025-04-28 | End: 2025-04-28

## 2025-04-28 RX ORDER — BUTYROSPERMUM PARKII(SHEA BUTTER), SIMMONDSIA CHINENSIS (JOJOBA) SEED OIL, ALOE BARBADENSIS LEAF EXTRACT .01; 1; 3.5 G/100G; G/100G; G/100G
250 LIQUID TOPICAL DAILY
COMMUNITY

## 2025-04-28 RX ORDER — CHOLECALCIFEROL (VITAMIN D3) 25 MCG
25 TABLET ORAL DAILY
COMMUNITY

## 2025-04-28 RX ORDER — FENTANYL CITRATE 50 UG/ML
INJECTION, SOLUTION INTRAMUSCULAR; INTRAVENOUS AS NEEDED
Status: DISCONTINUED | OUTPATIENT
Start: 2025-04-28 | End: 2025-04-28

## 2025-04-28 RX ADMIN — PROPOFOL 50 MG: 10 INJECTION, EMULSION INTRAVENOUS at 12:26

## 2025-04-28 RX ADMIN — PROPOFOL 50 MG: 10 INJECTION, EMULSION INTRAVENOUS at 12:19

## 2025-04-28 RX ADMIN — PROPOFOL 50 MG: 10 INJECTION, EMULSION INTRAVENOUS at 12:15

## 2025-04-28 RX ADMIN — SODIUM CHLORIDE 100 ML/HR: 0.9 INJECTION, SOLUTION INTRAVENOUS at 11:48

## 2025-04-28 RX ADMIN — FENTANYL CITRATE 50 MCG: 50 INJECTION INTRAMUSCULAR; INTRAVENOUS at 12:15

## 2025-04-28 RX ADMIN — FENTANYL CITRATE 50 MCG: 50 INJECTION INTRAMUSCULAR; INTRAVENOUS at 12:19

## 2025-04-28 RX ADMIN — PROPOFOL 50 MG: 10 INJECTION, EMULSION INTRAVENOUS at 12:22

## 2025-04-28 SDOH — HEALTH STABILITY: MENTAL HEALTH: CURRENT SMOKER: 1

## 2025-04-28 ASSESSMENT — COLUMBIA-SUICIDE SEVERITY RATING SCALE - C-SSRS
2. HAVE YOU ACTUALLY HAD ANY THOUGHTS OF KILLING YOURSELF?: NO
6. HAVE YOU EVER DONE ANYTHING, STARTED TO DO ANYTHING, OR PREPARED TO DO ANYTHING TO END YOUR LIFE?: NO
1. IN THE PAST MONTH, HAVE YOU WISHED YOU WERE DEAD OR WISHED YOU COULD GO TO SLEEP AND NOT WAKE UP?: NO

## 2025-04-28 ASSESSMENT — PAIN - FUNCTIONAL ASSESSMENT
PAIN_FUNCTIONAL_ASSESSMENT: 0-10

## 2025-04-28 ASSESSMENT — PAIN SCALES - GENERAL
PAINLEVEL_OUTOF10: 0 - NO PAIN
PAIN_LEVEL: 1
PAINLEVEL_OUTOF10: 0 - NO PAIN

## 2025-04-28 NOTE — ANESTHESIA POSTPROCEDURE EVALUATION
Patient: Kathya Hassan    Procedure Summary       Date: 04/28/25 Room / Location: [facility]    Anesthesia Start: 1203 Anesthesia Stop: 1234    Procedure: COLONOSCOPY Diagnosis:       Encounter for screening colonoscopy      History of colon polyps    Scheduled Providers: Marily Scott MD Responsible Provider: LA NENA Cole    Anesthesia Type: MAC ASA Status: 3            Anesthesia Type: MAC    Vitals Value Taken Time   /73 04/28/25 12:34   Temp 98.2 04/28/25 12:34   Pulse 73 04/28/25 12:34   Resp 16 04/28/25 12:34   SpO2 95 04/28/25 12:34       Anesthesia Post Evaluation    Patient location during evaluation: bedside  Patient participation: complete - patient participated  Level of consciousness: awake  Pain score: 1  Pain management: adequate  Airway patency: patent  Cardiovascular status: acceptable  Respiratory status: acceptable  Hydration status: acceptable  Postoperative Nausea and Vomiting: none        There were no known notable events for this encounter.

## 2025-04-28 NOTE — H&P
History Of Present Illness  Kathya Hassan is a 74 y.o. female presenting for colonoscopy.     Past Medical History  Medical History[1]    Surgical History  Surgical History[2]     Social History  She reports that she has been smoking cigarettes. She started smoking about 52 years ago. She has a 52.3 pack-year smoking history. She has never used smokeless tobacco. She reports that she does not currently use alcohol. She reports that she does not use drugs.    Family History  Family History[3]     Allergies  Codeine    Review of Systems     Physical Exam  Eyes:      Extraocular Movements: Extraocular movements intact.      Pupils: Pupils are equal, round, and reactive to light.   Cardiovascular:      Rate and Rhythm: Normal rate and regular rhythm.   Pulmonary:      Effort: Pulmonary effort is normal.   Abdominal:      Palpations: Abdomen is soft.   Skin:     General: Skin is warm and dry.   Neurological:      Mental Status: She is alert.   Psychiatric:         Mood and Affect: Mood normal.         Behavior: Behavior normal.          Last Recorded Vitals  There were no vitals taken for this visit.    Relevant Results             Assessment & Plan  Encounter for screening colonoscopy    History of colon polyps      Here for colonoscopy    I spent 15 minutes in the professional and overall care of this patient.      Marily Scott MD         [1]   Past Medical History:  Diagnosis Date    Disorder of the skin and subcutaneous tissue, unspecified 06/06/2019    Skin lesion of left leg    Encounter for screening for malignant neoplasm of cervix 05/02/2019    Cervical cancer screening    Encounter for screening for malignant neoplasm of colon 05/20/2019    Colon cancer screening    Encounter for screening for osteoporosis 05/02/2019    Encounter for screening for osteoporosis    Persons encountering health services in other specified circumstances 05/02/2019    Encounter to establish care   [2] No past surgical history on  file.  [3]   Family History  Problem Relation Name Age of Onset    Prostate cancer Father

## 2025-04-28 NOTE — ANESTHESIA PREPROCEDURE EVALUATION
Patient: Kathya Hassan    Procedure Information       Date/Time: 25 1215    Scheduled providers: Marily Scott MD    Procedure: COLONOSCOPY    Location: [facility]            Relevant Problems   Cardiac   (+) Essential hypertension   (+) Mixed hyperlipidemia   (+) Nonrheumatic mitral (valve) annulus calcification      Endocrine   (+) Morbid obesity (Multi)       Clinical information reviewed:   Tobacco  Allergies  Meds   Med Hx  Surg Hx   Fam Hx  Soc Hx        NPO Detail:  NPO/Void Status  Date of Last Liquid: 25  Time of Last Liquid: 730  Date of Last Solid: 25  Time of Last Solid:   Last Intake Type: Solid meal; Clear fluids  Time of Last Void: 1045         Physical Exam    Airway  Mallampati: II  TM distance: >3 FB  Neck ROM: full  Mouth openin finger widths     Cardiovascular   Rhythm: regular     Dental - normal exam     Pulmonary - normal examBreath sounds clear to auscultation     Abdominal - normal examAbdomen: soft  Bowel sounds: normal           Anesthesia Plan    History of general anesthesia?: no  History of complications of general anesthesia?: no    ASA 3     MAC     The patient is a current smoker.  Patient was previously instructed to abstain from smoking on day of procedure.  Patient did not smoke on day of procedure.    intravenous induction   Anesthetic plan and risks discussed with patient.  Use of blood products discussed with patient who consented to blood products.    Plan discussed with CRNA.

## 2025-04-28 NOTE — DISCHARGE INSTRUCTIONS
Patient Instructions after a Colonoscopy      The anesthetics, sedatives or narcotics which were given to you today will be acting in your body for the next 24 hours, so you might feel a little sleepy or groggy.  This feeling should slowly wear off. Carefully read and follow the instructions.     You received sedation today:  - Do not drive or operate any machinery or power tools of any kind.   - No alcoholic beverages today, not even beer or wine.  - Do not make any important decisions or sign any legal documents.  - No over the counter medications that contain alcohol or that may cause drowsiness.  - Do not make any important decisions or sign any legal documents.  - Make sure you have someone with you for first 24 hours.    While it is common to experience mild to moderate abdominal distention, gas, or belching after your procedure, if any of these symptoms occur following discharge from the GI Lab or within one week of having your procedure, call the [facility] to be advised whether a visit to your nearest Urgent Care or Emergency Department is indicated.  Take this paper with you if you go.     - If you develop an allergic reaction to the medications that were given during your procedure such as difficulty breathing, rash, hives, severe nausea, vomiting or lightheadedness.  - If you experience chest pain, shortness of breath, severe abdominal pain, fevers and chills.  -If you develop signs and symptoms of bleeding such as blood in your spit, if your stools turn black, tarry, or bloody  - If you have not urinated within 8 hours following your procedure.  - If your IV site becomes painful, red, inflamed, or looks infected.    If you received a biopsy/polypectomy/sphincterotomy the following instructions apply below:    __ Do not use Aspirin containing products, non-steroidal medications or anti-coagulants for one week following your procedure. (Examples of these types of medications are: Advil,  Arthrotec, Aleve, Coumadin, Ecotrin, Heparin, Ibuprofen, Indocin, Motrin, Naprosyn, Nuprin, Plavix, Vioxx, and Voltarin, or their generic forms.  This list is not all-inclusive.  Check with your physician or pharmacist before resuming medications.)   __ Eat a soft diet today.  Avoid foods that are poorly digested for the next 24 hours.  These foods would include: nuts, beans, lettuce, red meats, and fried foods. Start with liquids and advance your diet as tolerated, gradually work up to eating solids.   __ Do not have a Barium Study or Enema for one week.    Your physician recommends the additional following instructions:    -You have a contact number available for emergencies. The signs and symptoms of potential delayed complications were discussed with you. You may return to normal activities tomorrow.  -Resume your previous diet.  -Continue your present medications.   -We are waiting for your pathology results.  -Your physician has recommended a repeat colonoscopy (date to be determined after pending pathology results are reviewed) for surveillance based on pathology results.  -The findings and recommendations have been discussed with you.  -The findings and recommendations were discussed with your family.  - Please see Medication Reconciliation Form for new medication/medications prescribed.       If you experience any problems or have any questions following discharge from the GI Lab, please call:        Nurse Signature                                                                        Date___________________                                                                            Patient/Responsible Party Signature                                        Date___________________

## 2025-06-21 PROBLEM — R60.0 BILATERAL LOWER EXTREMITY EDEMA: Status: ACTIVE | Noted: 2025-06-21

## 2025-06-21 PROBLEM — Z72.0 TOBACCO USE: Status: ACTIVE | Noted: 2025-06-21

## 2025-06-21 NOTE — PROGRESS NOTES
[facility] Vascular Cardiology    Patient Name: Kathya Hassan  Patient : 1950    Scribe Attestation  By signing my name below, IDigna Scribe attest that this documentation has been prepared under the direction and in the presence of Abdias Mancilla DO.    Scribe Attestation  By signing my name below, Lori AGARWAL , Amina   attest that this documentation has been prepared under the direction and in the presence of Abdias Mancilla DO.    Physician Attestation  Abdias AGARWAL DO, personally performed the services described in the documentation as scribed by Digna Nava in my presence, and confirm it is both accurate and complete.    Reason for visit:  This is a 75-year-old female here for follow-up regarding coronary artery calcification, calcification of the mitral annulus, hypertension, dyslipidemia, lower extremity edema, morbid obesity, and tobacco use.     HPI:  This is a 75-year-old female here for follow-up regarding coronary artery calcification, calcification of the mitral annulus, hypertension, dyslipidemia, lower extremity edema, morbid obesity, and tobacco use.  The patient was last evaluated by me in 2024.  At that visit I had suggested starting diuretic therapy which the patient declined and otherwise asked that she follow-up in 1 year.  CMP done in 2025 showed a serum sodium of 140, serum potassium 3.4, serum creatinine is 0.53, normal ALT/AST, CBC showed hemoglobin of 15.7. Lipid panel done in 2025 showed an LDL cholesterol 49 and triglycerides of 145 while on rosuvastatin 40 mg daily. ECG done today showed sinus rhythm, 75 beats per minute. She does not have any new heart concerns today, she reports taking all medications as prescribed. She is still smoking, she has tried nicotine patches and gum in the past. She states smoking is a nervous habit. She is wearing compression stockings regularly to help with pitting edema.            Assessment/Plan:   1. Coronary artery calcification  The patient has a history of coronary artery calcification with a total score of 2578.58.  ECG done today showed sinus rhythm, 75 beats per minute.  She denies anginal chest discomfort.  Blood pressure appears controlled on exam today.  She should continue current antihypertensive medications.   Nuclear stress done in June 2023 showed normal perfusion with a calculated ejection fraction of 70%.   Echocardiogram done in June 2023 showed normal left ventricular systolic function with an ejection fraction of 55 to 60%, normal right ventricular systolic function, mild mitral annular calcification, mild mitral valve regurgitation.  Recent lab works as noted in the HPI.  Lipid panel done in March 2025 showed an LDL cholesterol 49 and triglycerides of 145 while on rosuvastatin 40 mg daily.   Lab works will be done today and again in 6 months prior to the next visit.   Please see lifestyle recommendations below.   Follow up in 1 year and sooner if necessary.      2. Calcification of the mitral annulus  The patient was noted to have calcification of the mitral annulus on CT cardiac scoring.  Echocardiogram and nuclear stress test as noted above.  Continue risk factor modification.      3. Hypertension  The patient has a history of hypertension and blood pressure appears controlled on exam today.   She should continue her current antihypertensive medications and monitor her blood pressure at home.   Continue risk factor modification.      4. Dyslipidemia  Lipid panel done in March 2025 showed an LDL cholesterol 49 and triglycerides of 145 while on rosuvastatin 40 mg daily.   Please see lifestyle recommendations below.     5. Lower extremity edema  The patient has 1+ pitting bilateral lower extremity edema on exam today.  Recent lab works as noted in the HPI.   Lab works will be done today and again in 6 months prior to the next visit.   I discussed with her the  importance of following a low-sodium heart healthy diet as well as weight loss, wearing compression stockings and elevating legs when seated.     6. Morbid obesity  Please see lifestyle recommendations below.     7. Tobacco use  The patient is a current smoker.   I discussed with her the importance of smoking cessation as well as several strategies to assist her in quitting smoking.  Will refer patient to tobacco cessation counseling.    8. Hypokalemia  Serum potassium done in March 2025 was 3.4.  Lab works will be done today and again in 6 months prior to the next visit.   He is currently on potassium supplement.  He should consume potassium-rich, heart healthy diet.      Orders:   BMP/magnesium  Referral to tobacco cessation counseling.  BMP/CBC/magnesium in 6 months,   Follow-up in 1 year.    Lifestyle Recommendations  I recommend a whole-food plant-based diet, an eating pattern that encourages the consumption of unrefined plant foods (such as fruits, vegetables, tubers, whole grains, legumes, nuts and seeds) and discourages meats, dairy products, eggs and processed foods.     The AHA/ACC recommends that the patient consume a dietary pattern that emphasizes intake of vegetables, fruits, and whole grains; includes low-fat dairy products, poultry, fish, legumes, non-tropical vegetable oils, and nuts; and limits intake of sodium, sweets, sugar-sweetened beverages, and red meats.  Adapt this dietary pattern to appropriate calorie requirements (a 500-750 kcal/day deficit to loose weight), personal and cultural food preferences, and nutrition therapy for other medical conditions (including diabetes).  Achieve this pattern by following plans such as the [place] Mediterranean, DASH dietary pattern, or AHA diet.     Engage in 2 hours and 30 minutes per week of moderate-intensity physical activity, or 1 hour and 15 minutes (75 minutes) per week of vigorous-intensity aerobic physical activity, or an equivalent combination of  moderate and vigorous-intensity aerobic physical activity. Aerobic activity should be performed in episodes of at least 10 minutes preferably spread throughout the week.     Adhering to a heart healthy diet, regular exercise habits, avoidance of tobacco products, and maintenance of a healthy weight are crucial components of their heart disease risk reduction.     Any positive review of systems not specifically addressed in the office visit today should be evaluated and treated by the patients primary care physician or in an emergency department if necessary     Patient was notified that results from ordered tests will be called to the patient if it changes current management; it will otherwise be discussed at a future appointment and available on  [facility].     Thank you for allowing me to participate in the care of this patient.        This document was generated using the assistance of voice recognition software. If there are any errors of spelling, grammar, syntax, or meaning; please feel free to contact me directly for clarification.    Past Medical History:  She has a past medical history of Colon polyp, Disorder of the skin and subcutaneous tissue, unspecified (06/06/2019), Encounter for screening for malignant neoplasm of cervix (05/02/2019), Encounter for screening for malignant neoplasm of colon (05/20/2019), Encounter for screening for osteoporosis (05/02/2019), Hyperlipidemia, Hypertension, and Persons encountering health services in other specified circumstances (05/02/2019).    Past Surgical History:  She has a past surgical history that includes Tonsillectomy and Colonoscopy w/ polypectomy (2020).      Social History:  She reports that she has been smoking cigarettes. She started smoking about 52 years ago. She has a 52.5 pack-year smoking history. She has never used smokeless tobacco. She reports that she does not currently use alcohol. She reports that she does not use drugs.    Family  "History:  Family History  Problem Relation Name Age of Onset    Prostate cancer Father          Allergies:  Codeine    Outpatient Medications:  Current Outpatient Medications   Medication Instructions    amLODIPine (NORVASC) 10 mg, oral, Daily    chlorthalidone (HYGROTON) 25 mg, oral, Daily    cholecalciferol (VITAMIN D3) 25 mcg, Daily    cyanocobalamin (VITAMIN B-12) 2,000 mcg, Daily    potassium chloride CR 20 mEq ER tablet 40 mEq, oral, Daily    rosuvastatin (CRESTOR) 40 mg, oral, Daily    saccharomyces boulardii (FLORASTOR) 250 mg, Daily        ROS:  A 14 point review of systems was done and is negative other than as stated in HPI    Vitals:      9/13/2024    10:20 AM 9/13/2024    10:42 AM 3/14/2025    10:14 AM 4/28/2025    11:28 AM 4/28/2025    12:33 PM 4/28/2025    12:43 PM 4/28/2025    12:53 PM   Vitals   Systolic 140 118 124 115 131 127 122   Diastolic 70 70 80 95 73 70 68   BP Location     Left arm Left arm Left arm   Heart Rate 86  76 76 74 81 73   Temp 36.1 °C (96.9 °F)  36.3 °C (97.3 °F) 36.4 °C (97.5 °F) 36.8 °C (98.2 °F)  36.1 °C (97 °F)   Resp    14 14 16 16   Height   1.676 m (5' 6\") 1.676 m (5' 6\")      Weight (lb) 235  227 227      BMI 37.93 kg/m2  36.64 kg/m2 36.64 kg/m2      BSA (m2) 2.23 m2  2.19 m2 2.19 m2           Physical Exam:   Constitutional: Cooperative, in no acute distress, alert, appears stated age.   Skin: Skin color, texture, turgor normal. No rashes or lesions.   Head: Normocephalic. No masses, lesions, tenderness or abnormalities   Eyes: Extraocular movements are grossly intact.   Mouth and throat: Mucous membranes moist   Neck: Neck supple, no carotid bruits, no JVD   Respiratory: Lungs clear to auscultation, no wheezing or rhonchi, no use of accessory muscles   Chest wall: No scars, normal excursion with respiration   Cardiovascular: Regular rhythm without murmur, gallop, or rubs   Gastrointestinal: Abdomen soft, nontender. Bowel sounds normal. Morbidly obese.  Musculoskeletal: " Strength equal in upper extremities   Extremities:  1+ pitting edema   Neurologic: Sensation grossly intact, alert and oriented x3        Intake/Output:   No intake/output data recorded.    Outpatient Medications  Current Outpatient Medications on File Prior to Visit   Medication Sig Dispense Refill    amLODIPine (Norvasc) 10 mg tablet Take 1 tablet (10 mg) by mouth once daily. 30 tablet 5    chlorthalidone (Hygroton) 25 mg tablet Take 1 tablet (25 mg) by mouth once daily. 90 tablet 1    cholecalciferol (Vitamin D3) 25 mcg (1,000 units) tablet Take 1 tablet (25 mcg) by mouth once daily.      cyanocobalamin (Vitamin B-12) 2,000 mcg tablet Take 1 tablet (2,000 mcg) by mouth once daily.      potassium chloride CR 20 mEq ER tablet Take 2 tablets (40 mEq) by mouth once daily. 180 tablet 1    rosuvastatin (Crestor) 40 mg tablet Take 1 tablet (40 mg) by mouth once daily. 90 tablet 3    saccharomyces boulardii (Florastor) 250 mg capsule Take 1 capsule (250 mg) by mouth once daily. Pre-biotic combo       No current facility-administered medications on file prior to visit.       Labs: (past 26 weeks)  Recent Results (from the past 26 weeks)   Lipid Panel    Collection Time: 03/04/25  9:14 AM   Result Value Ref Range    CHOLESTEROL, TOTAL 145 <200 mg/dL    HDL CHOLESTEROL 73 > OR = 50 mg/dL    TRIGLYCERIDES 145 <150 mg/dL    LDL-CHOLESTEROL 49 mg/dL (calc)    CHOL/HDLC RATIO 2.0 <5.0 (calc)    NON HDL CHOLESTEROL 72 <130 mg/dL (calc)   Comprehensive Metabolic Panel    Collection Time: 03/04/25  9:14 AM   Result Value Ref Range    GLUCOSE 88 65 - 99 mg/dL    UREA NITROGEN (BUN) 15 7 - 25 mg/dL    CREATININE 0.53 (L) 0.60 - 1.00 mg/dL    EGFR 97 > OR = 60 mL/min/1.73m2    SODIUM 140 135 - 146 mmol/L    POTASSIUM 3.4 (L) 3.5 - 5.3 mmol/L    CHLORIDE 99 98 - 110 mmol/L    CARBON DIOXIDE 30 20 - 32 mmol/L    ELECTROLYTE BALANCE 11 7 - 17 mmol/L (calc)    CALCIUM 9.8 8.6 - 10.4 mg/dL    PROTEIN, TOTAL 7.1 6.1 - 8.1 g/dL    ALBUMIN  4.5 3.6 - 5.1 g/dL    BILIRUBIN, TOTAL 1.2 0.2 - 1.2 mg/dL    ALKALINE PHOSPHATASE 80 37 - 153 U/L    AST 17 10 - 35 U/L    ALT 10 6 - 29 U/L   CBC and Auto Differential    Collection Time: 03/04/25  9:14 AM   Result Value Ref Range    WHITE BLOOD CELL COUNT 6.5 3.8 - 10.8 Thousand/uL    RED BLOOD CELL COUNT 5.39 (H) 3.80 - 5.10 Million/uL    HEMOGLOBIN 15.7 (H) 11.7 - 15.5 g/dL    HEMATOCRIT 47.1 (H) 35.0 - 45.0 %    MCV 87.4 80.0 - 100.0 fL    MCH 29.1 27.0 - 33.0 pg    MCHC 33.3 32.0 - 36.0 g/dL    RDW 13.8 11.0 - 15.0 %    PLATELET COUNT 285 140 - 400 Thousand/uL    MPV 10.3 7.5 - 12.5 fL    ABSOLUTE NEUTROPHILS 4,147 1,500 - 7,800 cells/uL    ABSOLUTE LYMPHOCYTES 1,788 850 - 3,900 cells/uL    ABSOLUTE MONOCYTES 481 200 - 950 cells/uL    ABSOLUTE EOSINOPHILS 33 15 - 500 cells/uL    ABSOLUTE BASOPHILS 52 0 - 200 cells/uL    NEUTROPHILS 63.8 %    LYMPHOCYTES 27.5 %    MONOCYTES 7.4 %    EOSINOPHILS 0.5 %    BASOPHILS 0.8 %   Vitamin B12    Collection Time: 03/04/25  9:14 AM   Result Value Ref Range    VITAMIN B12 565 200 - 1,100 pg/mL   Vitamin D 25-Hydroxy,Total (for eval of Vitamin D levels)    Collection Time: 03/04/25  9:14 AM   Result Value Ref Range    VITAMIN D,25-OH,TOTAL,IA 25 (L) 30 - 100 ng/mL       ECG  No results found for this or any previous visit (from the past 4464 hours).    Echocardiogram  No results found for this or any previous visit from the past 1095 days.      CV Studies:  EKG: No results found for this or any previous visit (from the past 4464 hours).  Echocardiogram:   Echocardiogram     [address] [address]  Phone [phone] Fax [phone]    TRANSTHORACIC ECHOCARDIOGRAM REPORT      Patient Name:     ROCIO Huddleston Physician:   [id number] Abdias Mancilla DO  Study Date:       6/8/2023       Referring Physician: ABDIAS MANCILLA  MRN/PID:          [id number]       PCP:  Accession/Order#: [id number]   Department Location: [place]  [facility]  YOB: 1950       Fellow:  Gender:           F              Nurse:  Admit Date:                      Sonographer:         Mere Knowles [facility]  Admission Status: Outpatient     Additional Staff:  Height:           167.64 cm      CC Report to:  Weight:           99.79 kg       Study Type:          Echocardiogram  BSA:              2.08 m2  Blood Pressure: 118 /84 mmHg    Diagnosis/ICD: I25.10-Atherosclerotic heart disease of native coronary artery  without angina pectoris; I34.81-Nonrheumatic mitral valve annulus  calcification  Indication:    CAD  Procedure/CPT: Echo Complete w Full [id number]    Patient History:  Pertinent History: Dyslipidemia and HTN.    Study Detail: The following Echo studies were performed: 2D, M-Mode, Doppler and  color flow.      PHYSICIAN INTERPRETATION:  Left Ventricle: Left ventricular systolic function is normal, with an estimated ejection fraction of 55-60%. There are no regional wall motion abnormalities. The left ventricular cavity size is normal. Spectral Doppler shows a normal pattern of left ventricular diastolic filling. Basal septal hypertrophy.  Left Atrium: The left atrium is normal in size.  Right Ventricle: The right ventricle is normal in size. There is normal right ventricular global systolic function.  Right Atrium: The right atrium is normal in size.  Aortic Valve: The aortic valve is trileaflet. There is trivial aortic valve regurgitation. The peak instantaneous gradient of the aortic valve is 6.2 mmHg. The mean gradient of the aortic valve is 3.1 mmHg.  Mitral Valve: The mitral valve is normal in structure. There is mild mitral annular calcification. There is mild mitral valve regurgitation.  Tricuspid Valve: The tricuspid valve is structurally normal. There is trace tricuspid regurgitation.  Pulmonic Valve: The pulmonic valve is structurally normal. There is no indication of pulmonic valve regurgitation.  Pericardium: There is no pericardial  effusion noted.  Aorta: The aortic root is normal.      CONCLUSIONS:  1. Left ventricular systolic function is normal with a 55-60% estimated ejection fraction.    QUANTITATIVE DATA SUMMARY:  2D MEASUREMENTS:  Normal Ranges:  IVSd:          0.97 cm   (0.6-1.1cm)  LVPWd:         0.96 cm   (0.6-1.1cm)  LVIDd:         4.77 cm   (3.9-5.9cm)  LVIDs:         2.45 cm  LV Mass Index: 77.2 g/m2  LV % FS        48.7 %    LA VOLUME:  Normal Ranges:  LA Vol A4C:        61.1 ml    (22+/-6mL/m2)  LA Vol A2C:        55.0 ml  LA Vol BP:         59.5 ml  LA Vol Index A4C:  29.3 ml/m2  LA Vol Index A2C:  26.4 ml/m2  LA Vol Index BP:   28.6 ml/m2  LA Area A4C:       19.7 cm2  LA Area A2C:       19.2 cm2  LA Major Axis A4C: 5.4 cm  LA Major Axis A2C: 5.7 cm  LA Volume Index:   28.6 ml/m2  LA Vol A4C:        57.5 ml  LA Vol A2C:        52.9 ml    RA VOLUME BY A/L METHOD:  Normal Ranges:  RA Area A4C: 16.9 cm2    AORTA MEASUREMENTS:  Normal Ranges:  Ao Sinus, d: 3.20 cm (2.1-3.5cm)  Ao STJ, d:   2.60 cm (1.7-3.4cm)  Asc Ao, d:   3.00 cm (2.1-3.4cm)    LV SYSTOLIC FUNCTION BY 2D PLANIMETRY (MOD):  Normal Ranges:  EF-A4C View: 61.7 % (>=55%)  EF-A2C View: 68.5 %  EF-Biplane:  64.8 %    LV DIASTOLIC FUNCTION:  Normal Ranges:  MV Peak E:        0.78 m/s    (0.7-1.2 m/s)  MV Peak A:        0.74 m/s    (0.42-0.7 m/s)  E/A Ratio:        1.06        (1.0-2.2)  MV e'             0.08 m/s    (>8.0)  MV lateral e'     0.09 m/s  MV medial e'      0.08 m/s  MV A Dur:         128.45 msec  E/e' Ratio:       9.16        (<8.0)  PulmV A Revs Dur: 148.43 msec    MITRAL VALVE:  Normal Ranges:  MV DT: 238 msec (150-240msec)    AORTIC VALVE:  Normal Ranges:  AoV Vmax:                1.25 m/s (<=1.7m/s)  AoV Peak P.2 mmHg (<20mmHg)  AoV Mean PG:             3.1 mmHg (1.7-11.5mmHg)  LVOT Max Ivan:            1.20 m/s (<=1.1m/s)  AoV VTI:                 30.28 cm (18-25cm)  LVOT VTI:                30.46 cm  LVOT Diameter:           1.97 cm   (1.8-2.4cm)  AoV Area, VTI:           3.08 cm2 (2.5-5.5cm2)  AoV Area,Vmax:           2.95 cm2 (2.5-4.5cm2)  AoV Dimensionless Index: 1.01    RIGHT VENTRICLE:  RV 1   3.2 cm  RV 2   2.4 cm  RV 3   6.7 cm  TAPSE: 24.6 mm  RV s'  0.13 m/s    TRICUSPID VALVE/RVSP:  Normal Ranges:  Peak TR Velocity: 2.66 m/s  RV Syst Pressure: 31.3 mmHg (< 30mmHg)  TV E Vmax:        0.40 m/s  (0.3-0.7m/s)  TV A Vmax:        0.36 m/s  IVC Diam:         1.60 cm  TV e'             0.1 m/s    Pulmonary Veins:  PulmV A Revs Dur: 148.43 msec    AORTA:  Asc Ao Diam 2.98 cm      [id number] Abdias Mancilla DO  Electronically signed on 2023 at 9:32:04 AM         Final     Stress Testing IMGRESULT(SDL1115:1:1825): No results found for this or any previous visit from the past 1825 days.    Cardiac Catheterization: No results found for this or any previous visit from the past 1825 days.  No results found for this or any previous visit from the past 3650 days.     Cardiac Scoring:   CT cardiac scoring wo IV contrast     Narrative  Interpreted By:  ASPEN BRONSON MD  MRN: [id number]  Patient Name: ROCIO VALENZUELA    STUDY:  CT CARDIAC SCORING;  3/31/2023 7:56 am    INDICATION:  cardiac screening.    COMPARISON:  None.    ACCESSION NUMBER(S):  [id number]    ORDERING CLINICIAN:  JACKIE YUNG    TECHNIQUE:  Using prospective ECG gating, CT scan of the coronary arteries was  performed without intravenous contrast. Coronary calcium scoring  was  performed according to the method of Agatston.    FINDINGS:  The score and distribution of calcium in the coronary arteries is as  follows:    Left Main Coronary: 0.  Left Anterior Descendin.09.  Left Circumflex: 303.69.  Right Coronary Artery: 1802.8.    Total: 2578.58.    Emphysematous changes seen of the lungs. Calcified granuloma is seen  in the right lower lobe.    The aorta is without aneurysmal dilatation evident.Calcified  atheromatous disease is seen of the aorta.    The heart is not enlarged enlarged.  "There is calcification of the  mitral annulus. Pericardial effusion is evident.    Calcified granulomas are seen in the mediastinum.    Structures in the visualized upper abdomen are grossly unremarkable.    Impression  1. Coronary artery calcium score of 2578.58*.  2. Calcification of the mitral annulus. Echocardiogram may be helpful.  3. Emphysematous change of the lungs. Risk stratification for  pulmonary malignancy may be helpful. The patient may be eligible for  low-dose lung cancer screening CT.    *Coronary artery calcium scoring may be helpful in predicting the  risk for future coronary heart disease events.  According to the  American College of Cardiology Foundation Clinical Expert Consensus  Task Force, such testing provides important prognostic information in  patients with more than one coronary heart disease risk factor. The  coronary artery calcium score correlates with the annual risk of a  non-fatal myocardial infarction or coronary heart disease death.    Coronary artery score            Annual Risk    0-99                             0.4%  100-399                        1.3%  >400                            2.4%    These three \"breakpoints\" correspond to lower, intermediate and high  risk states for future coronary events.  Such information should be  used, along with appropriate clinical judgment, to make decisions  regarding the intensity of risk factor management strategies to treat  blood lipids and to modify other non-lipid coronary risk factors.    Reference: [place] P et al. Circulation.  2007; 115:402-426    AAA : No results found for this or any previous visit from the past 1825 days.    OTHER: No results found for this or any previous visit from the past 1825 days.    LAST IMAGING RESULTS  Colonoscopy Screening; High Risk Patient  Table formatting from the original result was not included.  Impression  The appendiceal orifice appeared normal.  The ileocecal valve appeared " normal.  Scattered diverticulosis of moderate severity in the sigmoid colon    Findings  The appendiceal orifice appeared normal.  The ileocecal valve appeared normal.  Few medium, scattered diverticula of moderate severity with no   inflammation containing no content in the sigmoid colon; no bleeding was   observed       Recommendation  Repeat screening colonoscopy in 10 years, due: 4/26/2035     Indication  Encounter for screening colonoscopy, History of colon polyps    Post-Op Diagnosis  None    Staff  Staff Role   Marily Scott MD Proceduralist     Medications  See Anesthesia Record.     Preprocedure  A history and physical has been performed, and patient medication   allergies have been reviewed. The patient's tolerance of previous   anesthesia has been reviewed. The risks and benefits of the procedure and   the sedation options and risks were discussed with the patient. All   questions were answered and informed consent obtained.    Details of the Procedure  The patient underwent monitored anesthesia care, which was administered by   an anesthesia professional. The patient's blood pressure, ECG, ETCO2,   heart rate, level of consciousness, oxygen and respirations were monitored   throughout the procedure. A digital rectal exam was performed. The scope   was introduced through the anus and advanced to the cecum. The quality of   bowel preparation was evaluated using the [place] Bowel Preparation Scale   with scores of: right colon = 2, transverse colon = 2, left colon = 2. The   total BBPS score was 6. Bowel prep was adequate. The patient experienced   no blood loss. The procedure was not difficult. The patient tolerated the   procedure well. There were no apparent adverse events.     Events  Procedure Events   Event Event Time   ENDO SCOPE IN TIME 4/28/2025 12:16 PM   ENDO CECUM REACHED 4/28/2025 12:22 PM   ENDO SCOPE OUT TIME 4/28/2025 12:30 PM     Specimens  No specimens collected    Procedure  Location  [facility]  [address] [facility] [id number] [phone]    Referring Provider  Marily Scott MD    Procedure Provider  Marily Scott MD      Problem List Items Addressed This Visit       Mixed hyperlipidemia (Chronic)    Morbid obesity (Multi) (Chronic)    Essential hypertension (Chronic)    Drug-induced hypokalemia (Chronic)    Coronary artery calcification - Primary    Overview   - Calcium score 2578.58 in 3/2023  - S/p consult with Dr. Mancilla in 7/2023.          Nonrheumatic mitral (valve) annulus calcification    Bilateral lower extremity edema    Tobacco use            Abdias Mancilla DO, FACC, FACOI

## 2025-07-10 ENCOUNTER — TELEPHONE (OUTPATIENT)
Dept: CARDIOLOGY | Facility: HOSPITAL | Age: 75
End: 2025-07-10
Payer: MEDICARE

## 2025-07-10 NOTE — TELEPHONE ENCOUNTER
Left VM for patient to confirm appointment tomorrow with provider, was instructed to contact the office to confirm.

## 2025-07-11 ENCOUNTER — APPOINTMENT (OUTPATIENT)
Dept: CARDIOLOGY | Facility: CLINIC | Age: 75
End: 2025-07-11
Payer: MEDICARE

## 2025-07-11 VITALS
WEIGHT: 227 LBS | HEIGHT: 66 IN | BODY MASS INDEX: 36.48 KG/M2 | DIASTOLIC BLOOD PRESSURE: 78 MMHG | SYSTOLIC BLOOD PRESSURE: 120 MMHG | HEART RATE: 75 BPM

## 2025-07-11 DIAGNOSIS — E66.01 MORBID OBESITY (MULTI): Chronic | ICD-10-CM

## 2025-07-11 DIAGNOSIS — E87.6 DRUG-INDUCED HYPOKALEMIA: Chronic | ICD-10-CM

## 2025-07-11 DIAGNOSIS — E78.2 MIXED HYPERLIPIDEMIA: Chronic | ICD-10-CM

## 2025-07-11 DIAGNOSIS — I34.81 NONRHEUMATIC MITRAL (VALVE) ANNULUS CALCIFICATION: ICD-10-CM

## 2025-07-11 DIAGNOSIS — Z72.0 TOBACCO USE: ICD-10-CM

## 2025-07-11 DIAGNOSIS — T50.905A DRUG-INDUCED HYPOKALEMIA: Chronic | ICD-10-CM

## 2025-07-11 DIAGNOSIS — R60.0 BILATERAL LOWER EXTREMITY EDEMA: ICD-10-CM

## 2025-07-11 DIAGNOSIS — I25.10 CORONARY ARTERY CALCIFICATION: Primary | ICD-10-CM

## 2025-07-11 DIAGNOSIS — I10 ESSENTIAL HYPERTENSION: Chronic | ICD-10-CM

## 2025-07-11 LAB
ATRIAL RATE: 75 BPM
P AXIS: 84 DEGREES
P OFFSET: 202 MS
P ONSET: 144 MS
PR INTERVAL: 154 MS
Q ONSET: 221 MS
QRS COUNT: 13 BEATS
QRS DURATION: 90 MS
QT INTERVAL: 418 MS
QTC CALCULATION(BAZETT): 466 MS
QTC FREDERICIA: 450 MS
R AXIS: 65 DEGREES
T AXIS: 54 DEGREES
T OFFSET: 430 MS
VENTRICULAR RATE: 75 BPM

## 2025-07-11 PROCEDURE — 99212 OFFICE O/P EST SF 10 MIN: CPT | Performed by: INTERNAL MEDICINE

## 2025-07-11 PROCEDURE — 93005 ELECTROCARDIOGRAM TRACING: CPT | Performed by: INTERNAL MEDICINE

## 2025-07-11 PROCEDURE — 3078F DIAST BP <80 MM HG: CPT | Performed by: INTERNAL MEDICINE

## 2025-07-11 PROCEDURE — 1159F MED LIST DOCD IN RCRD: CPT | Performed by: INTERNAL MEDICINE

## 2025-07-11 PROCEDURE — 3074F SYST BP LT 130 MM HG: CPT | Performed by: INTERNAL MEDICINE

## 2025-07-11 PROCEDURE — 99214 OFFICE O/P EST MOD 30 MIN: CPT | Performed by: INTERNAL MEDICINE

## 2025-07-11 PROCEDURE — 93010 ELECTROCARDIOGRAM REPORT: CPT | Performed by: INTERNAL MEDICINE

## 2025-07-12 LAB
ANION GAP SERPL CALCULATED.4IONS-SCNC: 11 MMOL/L (CALC) (ref 7–17)
BUN SERPL-MCNC: 21 MG/DL (ref 7–25)
BUN/CREAT SERPL: 37 (CALC) (ref 6–22)
CALCIUM SERPL-MCNC: 9.8 MG/DL (ref 8.6–10.4)
CHLORIDE SERPL-SCNC: 100 MMOL/L (ref 98–110)
CO2 SERPL-SCNC: 28 MMOL/L (ref 20–32)
CREAT SERPL-MCNC: 0.57 MG/DL (ref 0.6–1)
EGFRCR SERPLBLD CKD-EPI 2021: 95 ML/MIN/1.73M2
GLUCOSE SERPL-MCNC: 83 MG/DL (ref 65–139)
MAGNESIUM SERPL-MCNC: 1.8 MG/DL (ref 1.5–2.5)
POTASSIUM SERPL-SCNC: 3.7 MMOL/L (ref 3.5–5.3)
SODIUM SERPL-SCNC: 139 MMOL/L (ref 135–146)

## 2025-08-27 DIAGNOSIS — D58.2 ELEVATED HEMOGLOBIN: ICD-10-CM

## 2025-08-27 DIAGNOSIS — E55.9 VITAMIN D DEFICIENCY: ICD-10-CM

## 2025-08-27 DIAGNOSIS — E78.2 MIXED HYPERLIPIDEMIA: Chronic | ICD-10-CM

## 2025-08-27 DIAGNOSIS — I10 ESSENTIAL HYPERTENSION: Chronic | ICD-10-CM

## 2025-09-05 LAB
25(OH)D3+25(OH)D2 SERPL-MCNC: 40 NG/ML (ref 30–100)
ALBUMIN SERPL-MCNC: 4.4 G/DL (ref 3.6–5.1)
ALP SERPL-CCNC: 84 U/L (ref 37–153)
ALT SERPL-CCNC: 11 U/L (ref 6–29)
ANION GAP SERPL CALCULATED.4IONS-SCNC: 10 MMOL/L (CALC) (ref 7–17)
AST SERPL-CCNC: 18 U/L (ref 10–35)
BASOPHILS # BLD AUTO: 49 CELLS/UL (ref 0–200)
BASOPHILS NFR BLD AUTO: 0.8 %
BILIRUB SERPL-MCNC: 1.1 MG/DL (ref 0.2–1.2)
BUN SERPL-MCNC: 16 MG/DL (ref 7–25)
CALCIUM SERPL-MCNC: 9.9 MG/DL (ref 8.6–10.4)
CHLORIDE SERPL-SCNC: 99 MMOL/L (ref 98–110)
CHOLEST SERPL-MCNC: 132 MG/DL
CHOLEST/HDLC SERPL: 1.8 (CALC)
CO2 SERPL-SCNC: 31 MMOL/L (ref 20–32)
CREAT SERPL-MCNC: 0.61 MG/DL (ref 0.6–1)
EGFRCR SERPLBLD CKD-EPI 2021: 93 ML/MIN/1.73M2
EOSINOPHIL # BLD AUTO: 43 CELLS/UL (ref 15–500)
EOSINOPHIL NFR BLD AUTO: 0.7 %
ERYTHROCYTE [DISTWIDTH] IN BLOOD BY AUTOMATED COUNT: 14.1 % (ref 11–15)
GLUCOSE SERPL-MCNC: 79 MG/DL (ref 65–99)
HCT VFR BLD AUTO: 48.1 % (ref 35–45)
HDLC SERPL-MCNC: 73 MG/DL
HGB BLD-MCNC: 15.9 G/DL (ref 11.7–15.5)
LDLC SERPL CALC-MCNC: 38 MG/DL (CALC)
LYMPHOCYTES # BLD AUTO: 1714 CELLS/UL (ref 850–3900)
LYMPHOCYTES NFR BLD AUTO: 28.1 %
MCH RBC QN AUTO: 29.4 PG (ref 27–33)
MCHC RBC AUTO-ENTMCNC: 33.1 G/DL (ref 32–36)
MCV RBC AUTO: 88.9 FL (ref 80–100)
MONOCYTES # BLD AUTO: 561 CELLS/UL (ref 200–950)
MONOCYTES NFR BLD AUTO: 9.2 %
NEUTROPHILS # BLD AUTO: 3733 CELLS/UL (ref 1500–7800)
NEUTROPHILS NFR BLD AUTO: 61.2 %
NONHDLC SERPL-MCNC: 59 MG/DL (CALC)
PLATELET # BLD AUTO: 293 THOUSAND/UL (ref 140–400)
PMV BLD REES-ECKER: 10.1 FL (ref 7.5–12.5)
POTASSIUM SERPL-SCNC: 4.3 MMOL/L (ref 3.5–5.3)
PROT SERPL-MCNC: 6.9 G/DL (ref 6.1–8.1)
RBC # BLD AUTO: 5.41 MILLION/UL (ref 3.8–5.1)
SODIUM SERPL-SCNC: 140 MMOL/L (ref 135–146)
TRIGL SERPL-MCNC: 133 MG/DL
WBC # BLD AUTO: 6.1 THOUSAND/UL (ref 3.8–10.8)

## 2025-09-12 ENCOUNTER — APPOINTMENT (OUTPATIENT)
Dept: PRIMARY CARE | Facility: CLINIC | Age: 75
End: 2025-09-12
Payer: MEDICARE